# Patient Record
Sex: FEMALE | Race: WHITE | Employment: FULL TIME
[De-identification: names, ages, dates, MRNs, and addresses within clinical notes are randomized per-mention and may not be internally consistent; named-entity substitution may affect disease eponyms.]

---

## 2017-02-20 ENCOUNTER — TELEPHONE (OUTPATIENT)
Dept: FAMILY MEDICINE CLINIC | Facility: CLINIC | Age: 32
End: 2017-02-20

## 2017-02-21 ENCOUNTER — OFFICE VISIT (OUTPATIENT)
Dept: FAMILY MEDICINE CLINIC | Facility: CLINIC | Age: 32
End: 2017-02-21

## 2017-02-21 VITALS
HEIGHT: 63 IN | DIASTOLIC BLOOD PRESSURE: 78 MMHG | WEIGHT: 205 LBS | SYSTOLIC BLOOD PRESSURE: 110 MMHG | BODY MASS INDEX: 36.32 KG/M2

## 2017-02-21 DIAGNOSIS — R26.81 UNSTEADINESS: Primary | ICD-10-CM

## 2017-02-21 DIAGNOSIS — R42 VERTIGO: ICD-10-CM

## 2017-02-21 DIAGNOSIS — H57.02 ANISOCORIA: ICD-10-CM

## 2017-02-21 PROCEDURE — 99214 OFFICE O/P EST MOD 30 MIN: CPT | Performed by: FAMILY MEDICINE

## 2017-02-21 RX ORDER — PREDNISONE 20 MG/1
40 TABLET ORAL DAILY
Qty: 10 TABLET | Refills: 0 | Status: SHIPPED | OUTPATIENT
Start: 2017-02-21 | End: 2017-02-26

## 2017-02-23 ENCOUNTER — TELEPHONE (OUTPATIENT)
Dept: FAMILY MEDICINE CLINIC | Facility: CLINIC | Age: 32
End: 2017-02-23

## 2017-02-27 ENCOUNTER — TELEPHONE (OUTPATIENT)
Dept: FAMILY MEDICINE CLINIC | Facility: CLINIC | Age: 32
End: 2017-02-27

## 2017-02-27 RX ORDER — DIAZEPAM 5 MG/1
5 TABLET ORAL EVERY 8 HOURS PRN
Qty: 20 TABLET | Refills: 0 | Status: SHIPPED | OUTPATIENT
Start: 2017-02-27 | End: 2017-02-27

## 2017-02-27 ASSESSMENT — ENCOUNTER SYMPTOMS
CONSTIPATION: 0
NAUSEA: 1
DIARRHEA: 0
RESPIRATORY NEGATIVE: 1

## 2017-02-28 ENCOUNTER — TELEPHONE (OUTPATIENT)
Dept: FAMILY MEDICINE CLINIC | Facility: CLINIC | Age: 32
End: 2017-02-28

## 2017-02-28 RX ORDER — SCOLOPAMINE TRANSDERMAL SYSTEM 1 MG/1
1 PATCH, EXTENDED RELEASE TRANSDERMAL
Qty: 10 PATCH | Refills: 0 | Status: SHIPPED | OUTPATIENT
Start: 2017-02-28 | End: 2017-07-27 | Stop reason: ALTCHOICE

## 2017-03-06 ENCOUNTER — OFFICE VISIT (OUTPATIENT)
Dept: FAMILY MEDICINE CLINIC | Facility: CLINIC | Age: 32
End: 2017-03-06

## 2017-03-06 VITALS
BODY MASS INDEX: 36.68 KG/M2 | HEART RATE: 72 BPM | SYSTOLIC BLOOD PRESSURE: 128 MMHG | HEIGHT: 63 IN | DIASTOLIC BLOOD PRESSURE: 72 MMHG | RESPIRATION RATE: 16 BRPM | WEIGHT: 207 LBS

## 2017-03-06 DIAGNOSIS — R42 VERTIGO: Primary | ICD-10-CM

## 2017-03-06 PROCEDURE — 99213 OFFICE O/P EST LOW 20 MIN: CPT | Performed by: FAMILY MEDICINE

## 2017-03-06 ASSESSMENT — ENCOUNTER SYMPTOMS: RESPIRATORY NEGATIVE: 1

## 2017-03-10 ENCOUNTER — TELEPHONE (OUTPATIENT)
Dept: FAMILY MEDICINE CLINIC | Facility: CLINIC | Age: 32
End: 2017-03-10

## 2017-07-27 ENCOUNTER — HOSPITAL ENCOUNTER (OUTPATIENT)
Age: 32
Discharge: HOME | End: 2017-07-27
Payer: COMMERCIAL

## 2017-07-27 ENCOUNTER — OFFICE VISIT (OUTPATIENT)
Dept: FAMILY MEDICINE CLINIC | Age: 32
End: 2017-07-27
Payer: COMMERCIAL

## 2017-07-27 VITALS
DIASTOLIC BLOOD PRESSURE: 70 MMHG | HEIGHT: 63 IN | BODY MASS INDEX: 32.6 KG/M2 | WEIGHT: 184 LBS | SYSTOLIC BLOOD PRESSURE: 110 MMHG

## 2017-07-27 DIAGNOSIS — R00.2 PALPITATIONS: ICD-10-CM

## 2017-07-27 DIAGNOSIS — R63.4 WEIGHT LOSS: ICD-10-CM

## 2017-07-27 DIAGNOSIS — R00.2 PALPITATIONS: Primary | ICD-10-CM

## 2017-07-27 DIAGNOSIS — R45.89: ICD-10-CM

## 2017-07-27 DIAGNOSIS — R53.82 CHRONIC FATIGUE, UNSPECIFIED: ICD-10-CM

## 2017-07-27 LAB
ABSOLUTE EOS #: 0.1 K/UL (ref 0–0.4)
ABSOLUTE LYMPH #: 1.8 K/UL (ref 1.1–2.7)
ABSOLUTE MONO #: 0.5 K/UL (ref 0–1)
ANION GAP SERPL CALCULATED.3IONS-SCNC: 9 MMOL/L (ref 9–17)
BASOPHILS # BLD: 1 %
BASOPHILS ABSOLUTE: 0.1 K/UL (ref 0–0.2)
BUN BLDV-MCNC: 12 MG/DL (ref 6–20)
BUN/CREAT BLD: 14 (ref 9–20)
CALCIUM SERPL-MCNC: 9.4 MG/DL (ref 8.6–10.4)
CHLORIDE BLD-SCNC: 106 MMOL/L (ref 98–107)
CO2: 24 MMOL/L (ref 20–31)
CREAT SERPL-MCNC: 0.86 MG/DL (ref 0.5–0.9)
DIFFERENTIAL TYPE: YES
EOSINOPHILS RELATIVE PERCENT: 1 %
GFR AFRICAN AMERICAN: >60 ML/MIN
GFR NON-AFRICAN AMERICAN: >60 ML/MIN
GFR SERPL CREATININE-BSD FRML MDRD: ABNORMAL ML/MIN/{1.73_M2}
GFR SERPL CREATININE-BSD FRML MDRD: ABNORMAL ML/MIN/{1.73_M2}
GLUCOSE BLD-MCNC: 97 MG/DL (ref 70–99)
HCT VFR BLD CALC: 40.7 % (ref 36–46)
HEMOGLOBIN: 13.6 G/DL (ref 12–16)
LYMPHOCYTES # BLD: 19 %
MCH RBC QN AUTO: 29.1 PG (ref 26–34)
MCHC RBC AUTO-ENTMCNC: 33.4 G/DL (ref 31–37)
MCV RBC AUTO: 87.3 FL (ref 80–100)
MONOCYTES # BLD: 5 %
PDW BLD-RTO: 15.3 % (ref 12.1–15.2)
PLATELET # BLD: 321 K/UL (ref 140–450)
PLATELET ESTIMATE: ABNORMAL
PMV BLD AUTO: ABNORMAL FL (ref 6–12)
POTASSIUM SERPL-SCNC: 5.7 MMOL/L (ref 3.7–5.3)
RBC # BLD: 4.67 M/UL (ref 4–5.2)
RBC # BLD: ABNORMAL 10*6/UL
SEG NEUTROPHILS: 74 %
SEGMENTED NEUTROPHILS ABSOLUTE COUNT: 7.4 K/UL (ref 2.5–7)
SODIUM BLD-SCNC: 139 MMOL/L (ref 135–144)
TSH SERPL DL<=0.05 MIU/L-ACNC: 2.35 MIU/L (ref 0.3–5)
WBC # BLD: 10 K/UL (ref 3.5–11)
WBC # BLD: ABNORMAL 10*3/UL

## 2017-07-27 PROCEDURE — 80048 BASIC METABOLIC PNL TOTAL CA: CPT

## 2017-07-27 PROCEDURE — 84443 ASSAY THYROID STIM HORMONE: CPT

## 2017-07-27 PROCEDURE — 85025 COMPLETE CBC W/AUTO DIFF WBC: CPT

## 2017-07-27 PROCEDURE — 36415 COLL VENOUS BLD VENIPUNCTURE: CPT

## 2017-07-27 PROCEDURE — 99214 OFFICE O/P EST MOD 30 MIN: CPT | Performed by: FAMILY MEDICINE

## 2017-07-27 RX ORDER — HYDROXYZINE PAMOATE 25 MG/1
25 CAPSULE ORAL 3 TIMES DAILY PRN
Qty: 60 CAPSULE | Refills: 3
Start: 2017-07-27 | End: 2017-11-17 | Stop reason: SDUPTHER

## 2017-07-27 RX ORDER — PROPRANOLOL HYDROCHLORIDE 10 MG/1
10 TABLET ORAL 3 TIMES DAILY PRN
Qty: 60 TABLET | Refills: 3 | Status: SHIPPED | OUTPATIENT
Start: 2017-07-27 | End: 2018-06-27 | Stop reason: SDUPTHER

## 2017-07-27 ASSESSMENT — PATIENT HEALTH QUESTIONNAIRE - PHQ9
2. FEELING DOWN, DEPRESSED OR HOPELESS: 0
SUM OF ALL RESPONSES TO PHQ9 QUESTIONS 1 & 2: 0
1. LITTLE INTEREST OR PLEASURE IN DOING THINGS: 0
SUM OF ALL RESPONSES TO PHQ QUESTIONS 1-9: 0

## 2017-07-28 ENCOUNTER — TELEPHONE (OUTPATIENT)
Dept: FAMILY MEDICINE CLINIC | Age: 32
End: 2017-07-28

## 2017-07-28 DIAGNOSIS — E87.5 HYPERKALEMIA: Primary | ICD-10-CM

## 2017-07-28 RX ORDER — SODIUM POLYSTYRENE SULFONATE 15 G/60ML
15 SUSPENSION ORAL; RECTAL ONCE
Qty: 60 ML | Refills: 0 | Status: SHIPPED | OUTPATIENT
Start: 2017-07-28 | End: 2017-11-17

## 2017-07-30 ASSESSMENT — ENCOUNTER SYMPTOMS
EYES NEGATIVE: 1
GASTROINTESTINAL NEGATIVE: 1

## 2017-07-31 ENCOUNTER — TELEPHONE (OUTPATIENT)
Dept: FAMILY MEDICINE CLINIC | Age: 32
End: 2017-07-31

## 2017-07-31 ENCOUNTER — HOSPITAL ENCOUNTER (OUTPATIENT)
Age: 32
Discharge: HOME | End: 2017-07-31
Payer: COMMERCIAL

## 2017-07-31 DIAGNOSIS — E87.5 HYPERKALEMIA: ICD-10-CM

## 2017-07-31 LAB
ANION GAP SERPL CALCULATED.3IONS-SCNC: 11 MMOL/L (ref 9–17)
BUN BLDV-MCNC: 13 MG/DL (ref 6–20)
BUN/CREAT BLD: 17 (ref 9–20)
CALCIUM SERPL-MCNC: 9.4 MG/DL (ref 8.6–10.4)
CHLORIDE BLD-SCNC: 103 MMOL/L (ref 98–107)
CO2: 25 MMOL/L (ref 20–31)
CREAT SERPL-MCNC: 0.76 MG/DL (ref 0.5–0.9)
GFR AFRICAN AMERICAN: >60 ML/MIN
GFR NON-AFRICAN AMERICAN: >60 ML/MIN
GFR SERPL CREATININE-BSD FRML MDRD: NORMAL ML/MIN/{1.73_M2}
GFR SERPL CREATININE-BSD FRML MDRD: NORMAL ML/MIN/{1.73_M2}
GLUCOSE BLD-MCNC: 96 MG/DL (ref 70–99)
POTASSIUM SERPL-SCNC: 3.9 MMOL/L (ref 3.7–5.3)
SODIUM BLD-SCNC: 139 MMOL/L (ref 135–144)

## 2017-07-31 PROCEDURE — 36415 COLL VENOUS BLD VENIPUNCTURE: CPT

## 2017-07-31 PROCEDURE — 80048 BASIC METABOLIC PNL TOTAL CA: CPT

## 2017-08-01 ENCOUNTER — TELEPHONE (OUTPATIENT)
Dept: FAMILY MEDICINE CLINIC | Age: 32
End: 2017-08-01

## 2017-08-01 DIAGNOSIS — R00.2 PALPITATIONS: Primary | ICD-10-CM

## 2017-11-17 ENCOUNTER — OFFICE VISIT (OUTPATIENT)
Dept: FAMILY MEDICINE CLINIC | Age: 32
End: 2017-11-17
Payer: COMMERCIAL

## 2017-11-17 VITALS
SYSTOLIC BLOOD PRESSURE: 118 MMHG | DIASTOLIC BLOOD PRESSURE: 72 MMHG | BODY MASS INDEX: 29.95 KG/M2 | HEIGHT: 63 IN | WEIGHT: 169 LBS

## 2017-11-17 DIAGNOSIS — F41.0 PANIC ATTACKS: ICD-10-CM

## 2017-11-17 DIAGNOSIS — R00.2 PALPITATIONS: Primary | ICD-10-CM

## 2017-11-17 DIAGNOSIS — R63.4 WEIGHT LOSS: ICD-10-CM

## 2017-11-17 PROCEDURE — 99214 OFFICE O/P EST MOD 30 MIN: CPT | Performed by: FAMILY MEDICINE

## 2017-11-17 RX ORDER — HYDROXYZINE PAMOATE 25 MG/1
25 CAPSULE ORAL 3 TIMES DAILY PRN
Qty: 30 CAPSULE | Refills: 1 | Status: SHIPPED | OUTPATIENT
Start: 2017-11-17 | End: 2017-12-17

## 2017-11-17 RX ORDER — PROPRANOLOL HYDROCHLORIDE 10 MG/1
10 TABLET ORAL 3 TIMES DAILY PRN
Qty: 60 TABLET | Refills: 3 | Status: CANCELLED | OUTPATIENT
Start: 2017-11-17

## 2017-11-17 RX ORDER — HYDROXYZINE PAMOATE 25 MG/1
25 CAPSULE ORAL 3 TIMES DAILY PRN
Qty: 60 CAPSULE | Refills: 3 | Status: CANCELLED | OUTPATIENT
Start: 2017-11-17 | End: 2017-12-17

## 2017-11-17 NOTE — PROGRESS NOTES
Name: Terese Fang  : 1985         Chief Complaint:     Chief Complaint   Patient presents with    Palpitations    Dizziness    Anxiety       History of Present Illness:      Terese Fang is a 28 y.o.  female who presents with Palpitations; Dizziness; and Anxiety      HPI     Episode of palpitations a few days ago and again last night, lasting about 25 minutes. Had to squat down and hold breath few times which didn't help. Didn't have propranolol with her, had to be walked out by friends and driven home by a friend d/t feeling like she would pass out. Took propranolol when she got home and it helped within a few minutes. Prior to this hadn't had an episode for several mos. Feels out of rhythm for a few days after an episode like this. Feels well today. Saw cardiology at [facility] and didn't have any abnormal findings. Wore event monitor but couldn't do the reporting like she needed to while at work, so then she did regular 48h holter instead and didn't have any episodes during that time. She is considering an implantable recording device. Likes propranolol - doesn't cause nearly the drowsiness the lopressor does. Mood - still goes through spurts of anxiety. Last night at work anxiety without any trigger, went home and took a shower and had clammy feeling of hands, blotchy appearance of chest and anterior neck. Has had a handful of panic attacks. Still thinks she's better off without cymbalta. Weight loss not necessarily intentional but changed jobs and walks 7-9 miles per night now. Had IUD placed in August and is doing well with that, having some irreg spotting but not actual menses.     Past Medical History:     Past Medical History:   Diagnosis Date    Cervical dysplasia     Herpes simplex of female genitalia         Past Surgical History:     Past Surgical History:   Procedure Laterality Date    CERVIX BIOPSY      COLPOSCOPY      3/23/15    OTHER SURGICAL HISTORY  6-5-15 Cold knife bx of cervix        Medications:       Prior to Admission medications    Medication Sig Start Date End Date Taking? Authorizing Provider   metoprolol tartrate (LOPRESSOR) 25 MG tablet Take 0.5 tablets by mouth 2 times daily as needed (palpitations) 11/17/17  Yes Marcia Leon DO   hydrOXYzine (VISTARIL) 25 MG capsule Take 1 capsule by mouth 3 times daily as needed for Anxiety 11/17/17 12/17/17 Yes Marcia Leon DO   levonorgestrel [facility]) IUD 13.5 mg 1 each by Intrauterine route once for 1 dose Placed approx August 2017 11/17/17 11/17/17 Yes Marcia Leon DO   propranolol (INDERAL) 10 MG tablet Take 1 tablet by mouth 3 times daily as needed (palpitations) 7/27/17   Marcia Leon DO   meclizine (ANTIVERT) 25 MG tablet Take 1/2  -  1 tablet  TID  As needed for dizziness 8/3/16   Marcia Leon DO        Allergies:       Doxycycline    Social History:     Tobacco:    reports that she has been smoking Cigarettes. She has been smoking about 0.50 packs per day. She has never used smokeless tobacco.  Alcohol:      reports that she drinks alcohol. Drug Use:  reports that she does not use drugs. Family History:     Family History   Problem Relation Age of Onset    High Blood Pressure Father     Cancer Maternal Grandmother      ovarian    Cancer Maternal Aunt      ovarian       Review of Systems:     Positive and Negative as described in HPI    Review of Systems   Constitutional: Negative. Respiratory: Negative. Gastrointestinal: Negative. Physical Exam:     Vitals:  /72   Ht 5' 3\" (1.6 m)   Wt 169 lb (76.7 kg)   BMI 29.94 kg/m²   Physical Exam   Constitutional: She is oriented to person, place, and time. She appears well-developed and well-nourished. No distress. HENT:   Head: Normocephalic and atraumatic. Mouth/Throat: Oropharynx is clear and moist.   Eyes: Conjunctivae and EOM are normal. Pupils are equal, round, and reactive to light. Neck: Neck supple. Cardiovascular: Normal rate, regular rhythm and normal heart sounds. No peripheral edema. Pulmonary/Chest: Effort normal and breath sounds normal.   Lymphadenopathy:     She has no cervical adenopathy. Neurological: She is alert and oriented to person, place, and time. Skin: Skin is warm and dry. Psychiatric: She has a normal mood and affect. Judgment normal.   Nursing note and vitals reviewed. Data:     Lab Results   Component Value Date     07/31/2017    K 3.9 07/31/2017     07/31/2017    CO2 25 07/31/2017    BUN 13 07/31/2017    CREATININE 0.76 07/31/2017    GLUCOSE 96 07/31/2017    LABALBU 4.4 12/19/2012    BILITOT 0.74 12/19/2012    ALKPHOS 61 12/19/2012    AST 15 12/19/2012    ALT 12 12/19/2012     Lab Results   Component Value Date    WBC 10.0 07/27/2017    RBC 4.67 07/27/2017    HGB 13.6 07/27/2017    HCT 40.7 07/27/2017    MCV 87.3 07/27/2017    MCH 29.1 07/27/2017    MCHC 33.4 07/27/2017    RDW 15.3 07/27/2017     07/27/2017    MPV NOT REPORTED 07/27/2017     Lab Results   Component Value Date    TSH 2.35 07/27/2017     Lab Results   Component Value Date    CHOL 117 12/19/2012    HDL 47 12/19/2012         Assessment & Plan:       1. Palpitations     2. Panic attacks     3. Weight loss     ongoing palpitations for which she's undergone extensive workup. Reviewed available records from Dr. Robert Martinez via [facility]. Last office visit with Dr. Robert Martinez was prior to 48h holter, and I don't have the records from the holter monitor but sounds as though pt had no significant palpitations during that time frame anyway. Continue use of BB as needed. Refilled lopressor and she already has propranolol. Intermittent anxiety and panic attacks - cont vistaril prn. Had been on cymbalta but feels better without it. Further weight loss - likely is d/t lifestyle changes - initially had been r/t breakup and better mood & activity levels, but now pt has taken on a much more active job. Still in overweight range. Will monitor - if having unexpected weight loss would recommend some investigation. F/u 6 mos. fmla forms updated. Requested Prescriptions     Signed Prescriptions Disp Refills    metoprolol tartrate (LOPRESSOR) 25 MG tablet 30 tablet 1     Sig: Take 0.5 tablets by mouth 2 times daily as needed (palpitations)    hydrOXYzine (VISTARIL) 25 MG capsule 30 capsule 1     Sig: Take 1 capsule by mouth 3 times daily as needed for Anxiety    levonorgestrel (IGOR) IUD 13.5 mg 1 each 0     Si each by Intrauterine route once for 1 dose Placed approx 2017       Patient Instructions     SURVEY:    You may be receiving a survey from [employer] regarding your visit today. Please complete the survey to enable us to provide the highest quality of care to you and your family. If you cannot score us as very good on any question, please call the office to discuss how we could have made your experience exceptional.     Thank you. Dominique Mena received counseling on the following healthy behaviors: medication adherence  Reviewed prior labs and health maintenance. Continue current medications, diet and exercise. Discussed use, benefit, and side effects of prescribed medications. Barriers to medication compliance addressed. Patient given educational materials - see patient instructions. All patient questions answered. Patient voiced understanding.        Electronically signed by Erich Cote DO on 2017 at 1:19 PM

## 2017-11-21 ASSESSMENT — ENCOUNTER SYMPTOMS
RESPIRATORY NEGATIVE: 1
GASTROINTESTINAL NEGATIVE: 1

## 2018-03-13 ENCOUNTER — OFFICE VISIT (OUTPATIENT)
Dept: FAMILY MEDICINE CLINIC | Age: 33
End: 2018-03-13
Payer: COMMERCIAL

## 2018-03-13 VITALS
WEIGHT: 163 LBS | SYSTOLIC BLOOD PRESSURE: 100 MMHG | BODY MASS INDEX: 28.88 KG/M2 | HEIGHT: 63 IN | DIASTOLIC BLOOD PRESSURE: 60 MMHG

## 2018-03-13 DIAGNOSIS — F41.9 ANXIETY: ICD-10-CM

## 2018-03-13 DIAGNOSIS — I47.9 PAROXYSMAL TACHYCARDIA (HCC): Primary | ICD-10-CM

## 2018-03-13 DIAGNOSIS — R00.2 PALPITATIONS: ICD-10-CM

## 2018-03-13 PROCEDURE — 99214 OFFICE O/P EST MOD 30 MIN: CPT | Performed by: FAMILY MEDICINE

## 2018-03-13 RX ORDER — LORAZEPAM 0.5 MG/1
0.5 TABLET ORAL EVERY 8 HOURS PRN
Qty: 30 TABLET | Refills: 0 | Status: SHIPPED | OUTPATIENT
Start: 2018-03-13 | End: 2018-06-06 | Stop reason: SDUPTHER

## 2018-03-13 RX ORDER — PROPRANOLOL HYDROCHLORIDE 10 MG/1
10 TABLET ORAL 3 TIMES DAILY PRN
Qty: 60 TABLET | Refills: 3 | Status: CANCELLED | OUTPATIENT
Start: 2018-03-13

## 2018-03-13 ASSESSMENT — ENCOUNTER SYMPTOMS: RESPIRATORY NEGATIVE: 1

## 2018-03-13 ASSESSMENT — PATIENT HEALTH QUESTIONNAIRE - PHQ9
SUM OF ALL RESPONSES TO PHQ QUESTIONS 1-9: 0
SUM OF ALL RESPONSES TO PHQ9 QUESTIONS 1 & 2: 0
2. FEELING DOWN, DEPRESSED OR HOPELESS: 0
1. LITTLE INTEREST OR PLEASURE IN DOING THINGS: 0

## 2018-03-13 NOTE — PROGRESS NOTES
Name: Delcie Sicard  : 1985         Chief Complaint:     Chief Complaint   Patient presents with    Palpitations    Anxiety       History of Present Illness:      Delcie Sicard is a 33 y.o.  female who presents with Palpitations and Anxiety      HPI    Pt c/o recurrence of tachycardia and palpitations. Friday 3/9 while at work developed hr 170, lasted about 10 minutes. Had been having a physically hard day at work but had only been there about 2 1/2 hrs. Was putting boxes away and suddenly vision got cloudy. Her instinct was to run to an open area away from machines, sat down in a chair, and a  happened to be in the room. Broke out in full-body sweat, started to cry, looked pale. Painful palpitations. Had to go home from work d/t fatigue. Took BB within 5 min of onset of symptoms. Ever since then, has felt very anxious and panicked, on-edge. Not having rapid heart rate but feels like heart is skipping a beat intermittently, sometimes with a deep breath or sometimes with bending forward. Last episode prior to that had been about a month ago and work personnel almost called ems for her. Had been sparked by bending over to  a wrapper. Was having a lot of chest pain. Lasted about 45 minutes. HR had initially been 120, went up to almost 180, back down, went back up and stayed for quite a while. Took one of her beta blockers at onset. Interested in prn med for anxiety. Hydroxyzine doesn't help at all. Doesn't remember being given valium last yr but doesn't think she has it at home. Had been on cymbalta in the past and doesn't feel she responded well to it, doesn't want daily med. Went to work yesterday, a little iffy at the beginning but did ok overall. Sleeping well. Eats about twice a day and has a good appetite. If she eats at work it's something she packed from home. Works second shift, 3-11, and eats big meal after work.      Medical History:     Patient Active Problem List   Diagnosis    Palpitations    Dyspnea    Smoker motivated to quit    BMI 36.0-36.9,adult    Generalized anxiety disorder    Spells       Medications:       Prior to Admission medications    Medication Sig Start Date End Date Taking? Authorizing Provider   LORazepam (ATIVAN) 0.5 MG tablet Take 1 tablet by mouth every 8 hours as needed for Anxiety for up to 30 days. 3/13/18 4/12/18 Yes Daniela Matt DO   metoprolol tartrate (LOPRESSOR) 25 MG tablet Take 0.5 tablets by mouth 2 times daily as needed (palpitations) 11/17/17  Yes Daniela Matt DO   levonorgestrel [facility]) IUD 13.5 mg 1 each by Intrauterine route once for 1 dose Placed approx August 2017 11/17/17 3/13/18 Yes Daniela Matt DO   propranolol (INDERAL) 10 MG tablet Take 1 tablet by mouth 3 times daily as needed (palpitations) 7/27/17  Yes Daniela Matt DO   meclizine (ANTIVERT) 25 MG tablet Take 1/2  -  1 tablet  TID  As needed for dizziness 8/3/16  Yes Daniela Matt DO        Allergies:       Doxycycline    Review of Systems:     Positive and Negative as described in HPI    Review of Systems   HENT: Negative. Respiratory: Negative. Physical Exam:     Vitals:  /60   Ht 5' 3\" (1.6 m)   Wt 163 lb (73.9 kg)   BMI 28.87 kg/m²   Physical Exam   Constitutional: She is oriented to person, place, and time. She appears well-developed and well-nourished. No distress. HENT:   Head: Normocephalic and atraumatic. Right Ear: Tympanic membrane and ear canal normal.   Left Ear: Tympanic membrane and ear canal normal.   Nose: Nose normal.   Mouth/Throat: Oropharynx is clear and moist and mucous membranes are normal.   Eyes: Conjunctivae and EOM are normal.   Neck: Neck supple. Cardiovascular: Normal rate, regular rhythm and normal heart sounds. No peripheral edema. Pulmonary/Chest: Effort normal and breath sounds normal.   Lymphadenopathy:     She has no cervical adenopathy.    Neurological: She is alert and regarding your visit today. Please complete the survey to enable us to provide the highest quality of care to you and your family. If you cannot score us as very good on any question, please call the office to discuss how we could have made your experience exceptional.     Thank you. Vijay Pickering received counseling on the following healthy behaviors: medication adherence  Reviewed prior labs and health maintenance. Continue current medications, diet and exercise. Discussed use, benefit, and side effects of prescribed medications. Barriers to medication compliance addressed. Patient given educational materials - see patient instructions. All patient questions answered. Patient voiced understanding.        Electronically signed by Andrea Ibarra DO on 3/13/2018 at 10:04 PM

## 2018-03-13 NOTE — PATIENT INSTRUCTIONS
SURVEY:    You may be receiving a survey from [employer] regarding your visit today. Please complete the survey to enable us to provide the highest quality of care to you and your family. If you cannot score us as very good on any question, please call the office to discuss how we could have made your experience exceptional.     Thank you.

## 2018-03-15 RX ORDER — NICOTINE 21 MG/24HR
1 PATCH, TRANSDERMAL 24 HOURS TRANSDERMAL EVERY 24 HOURS
Qty: 42 PATCH | Refills: 0 | Status: SHIPPED | OUTPATIENT
Start: 2018-03-15 | End: 2018-11-21 | Stop reason: ALTCHOICE

## 2018-06-06 DIAGNOSIS — F41.9 ANXIETY: Primary | ICD-10-CM

## 2018-06-07 RX ORDER — LORAZEPAM 0.5 MG/1
0.5 TABLET ORAL EVERY 8 HOURS PRN
Qty: 30 TABLET | Refills: 0 | Status: SHIPPED | OUTPATIENT
Start: 2018-06-07 | End: 2018-09-17 | Stop reason: SDUPTHER

## 2018-06-27 ENCOUNTER — OFFICE VISIT (OUTPATIENT)
Dept: FAMILY MEDICINE CLINIC | Age: 33
End: 2018-06-27
Payer: COMMERCIAL

## 2018-06-27 VITALS
OXYGEN SATURATION: 99 % | DIASTOLIC BLOOD PRESSURE: 70 MMHG | WEIGHT: 170 LBS | SYSTOLIC BLOOD PRESSURE: 110 MMHG | BODY MASS INDEX: 30.12 KG/M2 | HEART RATE: 82 BPM | HEIGHT: 63 IN

## 2018-06-27 DIAGNOSIS — F41.9 ANXIETY: ICD-10-CM

## 2018-06-27 DIAGNOSIS — I47.9 PAROXYSMAL TACHYCARDIA (HCC): Primary | ICD-10-CM

## 2018-06-27 PROCEDURE — 99213 OFFICE O/P EST LOW 20 MIN: CPT | Performed by: FAMILY MEDICINE

## 2018-06-27 RX ORDER — PROPRANOLOL HYDROCHLORIDE 10 MG/1
10 TABLET ORAL 3 TIMES DAILY PRN
Qty: 60 TABLET | Refills: 5 | Status: SHIPPED | OUTPATIENT
Start: 2018-06-27 | End: 2019-03-26 | Stop reason: SDUPTHER

## 2018-06-27 ASSESSMENT — PATIENT HEALTH QUESTIONNAIRE - PHQ9
SUM OF ALL RESPONSES TO PHQ9 QUESTIONS 1 & 2: 0
1. LITTLE INTEREST OR PLEASURE IN DOING THINGS: 0
SUM OF ALL RESPONSES TO PHQ QUESTIONS 1-9: 0
2. FEELING DOWN, DEPRESSED OR HOPELESS: 0

## 2018-06-27 ASSESSMENT — ENCOUNTER SYMPTOMS
RESPIRATORY NEGATIVE: 1
GASTROINTESTINAL NEGATIVE: 1

## 2018-09-17 DIAGNOSIS — F41.9 ANXIETY: ICD-10-CM

## 2018-09-17 RX ORDER — LORAZEPAM 0.5 MG/1
0.5 TABLET ORAL EVERY 8 HOURS PRN
Qty: 30 TABLET | Refills: 0 | Status: SHIPPED | OUTPATIENT
Start: 2018-09-17 | End: 2018-09-25 | Stop reason: SDUPTHER

## 2018-09-17 NOTE — TELEPHONE ENCOUNTER
Ativan 0.5 mg    DM-alma    Health Maintenance   Topic Date Due    DTaP/Tdap/Td vaccine (1 - Tdap) 12/06/2005    Pneumococcal med risk (1 of 1 - PPSV23) 12/06/2005    Flu vaccine (1) 09/01/2018    Cervical cancer screen  07/28/2022    HIV screen  Addressed             (applicable per patient's age: Cancer Screenings, Depression Screening, Fall Risk Screening, Immunizations)    LDL Cholesterol (mg/dL)   Date Value   12/19/2012 59     AST (U/L)   Date Value   12/19/2012 15     ALT (U/L)   Date Value   12/19/2012 12     BUN (mg/dL)   Date Value   07/31/2017 13      (goal A1C is < 7)   (goal LDL is <100) need 30-50% reduction from baseline     BP Readings from Last 3 Encounters:   06/27/18 110/70   03/13/18 100/60   11/17/17 118/72    (goal /80)      All Future Testing planned in [facility]:  Lab Frequency Next Occurrence       Next Visit Date:  No future appointments.          Patient Active Problem List:     Palpitations     Dyspnea     Smoker motivated to quit     BMI 36.0-36.9,adult     Generalized anxiety disorder     Spells

## 2018-09-24 ENCOUNTER — TELEPHONE (OUTPATIENT)
Dept: FAMILY MEDICINE CLINIC | Age: 33
End: 2018-09-24

## 2018-09-24 DIAGNOSIS — F41.9 ANXIETY: ICD-10-CM
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
## 2018-09-25 RX ORDER — LORAZEPAM 0.5 MG/1
0.5 TABLET ORAL EVERY 8 HOURS PRN
Qty: 12 TABLET | Refills: 0 | Status: SHIPPED | OUTPATIENT
Start: 2018-09-25 | End: 2018-11-13 | Stop reason: SDUPTHER

## 2018-09-25 NOTE — TELEPHONE ENCOUNTER
rx sent for #12 which should last a month. Prefer she call monthly for refill. Needs to keep med locked up.

## 2018-11-13 DIAGNOSIS — F41.9 ANXIETY: ICD-10-CM

## 2018-11-13 RX ORDER — LORAZEPAM 0.5 MG/1
0.5 TABLET ORAL EVERY 8 HOURS PRN
Qty: 12 TABLET | Refills: 0 | Status: SHIPPED | OUTPATIENT
Start: 2018-11-13 | End: 2019-01-29 | Stop reason: SDUPTHER

## 2018-11-14 ENCOUNTER — TELEPHONE (OUTPATIENT)
Dept: FAMILY MEDICINE CLINIC | Age: 33
End: 2018-11-14

## 2018-11-14 NOTE — TELEPHONE ENCOUNTER
Patient called stating she has been having panic attacks, anxiety and palps. Dr. Shiloh Gillette does not have appointments available today or this week. She may need to be off work this week. She states she has FMLA. Please advise.

## 2018-11-20 ENCOUNTER — TELEPHONE (OUTPATIENT)
Dept: FAMILY MEDICINE CLINIC | Age: 33
End: 2018-11-20

## 2018-11-20 NOTE — TELEPHONE ENCOUNTER
DR. Valeria French had Deedee Hunter on medical leave and was supposed to return to work on 11/19. She said she didn't go to work yesterday and is not ready to go back today. She would like to know if she can get it extended through today. She would like to return to work on 11/26. She said she is off the rest of the week. Please let Deedee Hunter know. Health Maintenance   Topic Date Due    DTaP/Tdap/Td vaccine (1 - Tdap) 12/06/2005    Pneumococcal med risk (1 of 1 - PPSV23) 12/06/2005    Flu vaccine (1) 09/01/2018    Cervical cancer screen  09/24/2023    HIV screen  Addressed             (applicable per patient's age: Cancer Screenings, Depression Screening, Fall Risk Screening, Immunizations)    LDL Cholesterol (mg/dL)   Date Value   12/19/2012 59     AST (U/L)   Date Value   12/19/2012 15     ALT (U/L)   Date Value   12/19/2012 12     BUN (mg/dL)   Date Value   07/31/2017 13      (goal A1C is < 7)   (goal LDL is <100) need 30-50% reduction from baseline     BP Readings from Last 3 Encounters:   06/27/18 110/70   03/13/18 100/60   11/17/17 118/72    (goal /80)      All Future Testing planned in [facility]:  Lab Frequency Next Occurrence       Next Visit Date:  No future appointments.          Patient Active Problem List:     Palpitations     Dyspnea     Smoker motivated to quit     BMI 36.0-36.9,adult     Generalized anxiety disorder     Spells

## 2018-11-21 ENCOUNTER — OFFICE VISIT (OUTPATIENT)
Dept: FAMILY MEDICINE CLINIC | Age: 33
End: 2018-11-21
Payer: COMMERCIAL

## 2018-11-21 VITALS
HEIGHT: 69 IN | SYSTOLIC BLOOD PRESSURE: 110 MMHG | DIASTOLIC BLOOD PRESSURE: 70 MMHG | BODY MASS INDEX: 26.22 KG/M2 | WEIGHT: 177 LBS

## 2018-11-21 DIAGNOSIS — F41.9 ANXIETY: Primary | ICD-10-CM

## 2018-11-21 DIAGNOSIS — Z63.72 SPOUSE OF ALCOHOLIC: ICD-10-CM

## 2018-11-21 PROCEDURE — 99214 OFFICE O/P EST MOD 30 MIN: CPT | Performed by: FAMILY MEDICINE

## 2018-11-21 ASSESSMENT — ENCOUNTER SYMPTOMS: RESPIRATORY NEGATIVE: 1
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
## 2018-11-21 NOTE — LETTER
[address]  [place] [address]  Phone: [phone]  Fax: [phone]    Leticia Bhatia DO        November 21, 2018     Patient: Mike Perrin   YOB: 1985   Date of Visit: 11/21/2018       To Whom It May Concern: It is my medical opinion that Jadon Alvarez missed work 11/13-20 due to a medical condition. If you have any questions or concerns, please don't hesitate to call.     Sincerely,        Leticia Bhatia DO

## 2018-11-29 ENCOUNTER — TELEPHONE (OUTPATIENT)
Dept: FAMILY MEDICINE CLINIC | Age: 33
End: 2018-11-29

## 2019-01-29 DIAGNOSIS — F41.9 ANXIETY: ICD-10-CM

## 2019-01-29 RX ORDER — LORAZEPAM 0.5 MG/1
0.5 TABLET ORAL EVERY 8 HOURS PRN
Qty: 12 TABLET | Refills: 0 | Status: SHIPPED | OUTPATIENT
Start: 2019-01-29 | End: 2019-03-26 | Stop reason: SDUPTHER

## 2019-02-15 ENCOUNTER — TELEPHONE (OUTPATIENT)
Dept: FAMILY MEDICINE CLINIC | Age: 34
End: 2019-02-15

## 2019-03-26 DIAGNOSIS — F41.9 ANXIETY: ICD-10-CM

## 2019-03-26 RX ORDER — PROPRANOLOL HYDROCHLORIDE 10 MG/1
10 TABLET ORAL 3 TIMES DAILY PRN
Qty: 60 TABLET | Refills: 5 | Status: SHIPPED | OUTPATIENT
Start: 2019-03-26 | End: 2019-11-15 | Stop reason: SDUPTHER

## 2019-03-26 RX ORDER — LORAZEPAM 0.5 MG/1
0.5 TABLET ORAL EVERY 8 HOURS PRN
Qty: 12 TABLET | Refills: 0 | Status: SHIPPED | OUTPATIENT
Start: 2019-03-26 | End: 2019-05-01 | Stop reason: SDUPTHER

## 2019-05-01 ENCOUNTER — HOSPITAL ENCOUNTER (OUTPATIENT)
Dept: NON INVASIVE DIAGNOSTICS | Age: 34
Discharge: HOME | End: 2019-05-01
Payer: COMMERCIAL

## 2019-05-01 ENCOUNTER — OFFICE VISIT (OUTPATIENT)
Dept: FAMILY MEDICINE CLINIC | Age: 34
End: 2019-05-01
Payer: COMMERCIAL

## 2019-05-01 VITALS
WEIGHT: 189 LBS | HEART RATE: 87 BPM | SYSTOLIC BLOOD PRESSURE: 122 MMHG | HEIGHT: 63 IN | DIASTOLIC BLOOD PRESSURE: 86 MMHG | BODY MASS INDEX: 33.49 KG/M2 | OXYGEN SATURATION: 100 %

## 2019-05-01 DIAGNOSIS — R00.2 PALPITATIONS: Primary | ICD-10-CM

## 2019-05-01 DIAGNOSIS — F41.9 ANXIETY: ICD-10-CM

## 2019-05-01 DIAGNOSIS — I47.9 PAROXYSMAL TACHYCARDIA (HCC): ICD-10-CM

## 2019-05-01 DIAGNOSIS — F17.210 CIGARETTE SMOKER: ICD-10-CM

## 2019-05-01 DIAGNOSIS — R00.2 PALPITATIONS: ICD-10-CM

## 2019-05-01 PROCEDURE — 93226 XTRNL ECG REC<48 HR SCAN A/R: CPT

## 2019-05-01 PROCEDURE — 99214 OFFICE O/P EST MOD 30 MIN: CPT | Performed by: FAMILY MEDICINE

## 2019-05-01 PROCEDURE — 93225 XTRNL ECG REC<48 HRS REC: CPT

## 2019-05-01 RX ORDER — LORAZEPAM 0.5 MG/1
.5-1 TABLET ORAL EVERY 8 HOURS PRN
Qty: 30 TABLET | Refills: 0 | Status: SHIPPED | OUTPATIENT
Start: 2019-05-01 | End: 2019-05-31 | Stop reason: SDUPTHER

## 2019-05-01 RX ORDER — VARENICLINE TARTRATE 25 MG
KIT ORAL
Qty: 1 BOX | Refills: 0 | Status: SHIPPED | OUTPATIENT
Start: 2019-05-01 | End: 2019-11-15 | Stop reason: ALTCHOICE
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
## 2019-05-03 ASSESSMENT — ENCOUNTER SYMPTOMS
GASTROINTESTINAL NEGATIVE: 1
RESPIRATORY NEGATIVE: 1

## 2019-05-30 DIAGNOSIS — F41.9 ANXIETY: ICD-10-CM

## 2019-05-30 NOTE — TELEPHONE ENCOUNTER
Patient is asking for a refill on Lorazepam 0.5 mg    Drug [address] Maintenance   Topic Date Due    Pneumococcal 0-64 years Vaccine (1 of 1 - PPSV23) 12/06/1992    Varicella Vaccine (1 of 2 - 13+ 2-dose series) 12/06/1999    DTaP/Tdap/Td vaccine (1 - Tdap) 12/06/2005    Flu vaccine (Season Ended) 09/01/2019    Cervical cancer screen  09/24/2023    HIV screen  Addressed             (applicable per patient's age: Cancer Screenings, Depression Screening, Fall Risk Screening, Immunizations)    LDL Cholesterol (mg/dL)   Date Value   12/19/2012 59     AST (U/L)   Date Value   12/19/2012 15     ALT (U/L)   Date Value   12/19/2012 12     BUN (mg/dL)   Date Value   07/31/2017 13      (goal A1C is < 7)   (goal LDL is <100) need 30-50% reduction from baseline     BP Readings from Last 3 Encounters:   05/01/19 122/86   11/21/18 110/70   06/27/18 110/70    (goal /80)      All Future Testing planned in [facility]:  Lab Frequency Next Occurrence       Next Visit Date:  No future appointments.          Patient Active Problem List:     Palpitations     Smoker motivated to quit     Generalized anxiety disorder

## 2019-05-31 RX ORDER — LORAZEPAM 0.5 MG/1
.5-1 TABLET ORAL EVERY 8 HOURS PRN
Qty: 30 TABLET | Refills: 1 | Status: SHIPPED | OUTPATIENT
Start: 2019-05-31 | End: 2019-07-30

## 2019-07-29 ENCOUNTER — TELEPHONE (OUTPATIENT)
Dept: FAMILY MEDICINE CLINIC | Age: 34
End: 2019-07-29

## 2019-07-29 NOTE — TELEPHONE ENCOUNTER
Requested to speak to Sade Mosqueda. She wanted to give her a message to relay to Dr. Mary Carreno. Health Maintenance   Topic Date Due    Pneumococcal 0-64 years Vaccine (1 of 1 - PPSV23) 12/06/1992    Varicella Vaccine (1 of 2 - 13+ 2-dose series) 12/06/1999    DTaP/Tdap/Td vaccine (1 - Tdap) 12/06/2005    Flu vaccine (1) 09/01/2019    Cervical cancer screen  09/24/2023    HIV screen  Addressed             (applicable per patient's age: Cancer Screenings, Depression Screening, Fall Risk Screening, Immunizations)    LDL Cholesterol (mg/dL)   Date Value   12/19/2012 59     AST (U/L)   Date Value   12/19/2012 15     ALT (U/L)   Date Value   12/19/2012 12     BUN (mg/dL)   Date Value   07/31/2017 13      (goal A1C is < 7)   (goal LDL is <100) need 30-50% reduction from baseline     BP Readings from Last 3 Encounters:   05/01/19 122/86   11/21/18 110/70   06/27/18 110/70    (goal /80)      All Future Testing planned in [facility]:  Lab Frequency Next Occurrence       Next Visit Date:  No future appointments.          Patient Active Problem List:     Palpitations     Smoker motivated to quit     Generalized anxiety disorder

## 2019-07-31 ENCOUNTER — OFFICE VISIT (OUTPATIENT)
Dept: PRIMARY CARE CLINIC | Age: 34
End: 2019-07-31
Payer: COMMERCIAL

## 2019-07-31 VITALS
OXYGEN SATURATION: 100 % | WEIGHT: 193 LBS | DIASTOLIC BLOOD PRESSURE: 79 MMHG | BODY MASS INDEX: 32.95 KG/M2 | HEART RATE: 82 BPM | SYSTOLIC BLOOD PRESSURE: 124 MMHG | TEMPERATURE: 98.1 F | HEIGHT: 64 IN

## 2019-07-31 DIAGNOSIS — M54.12 CERVICAL RADICULOPATHY: Primary | ICD-10-CM

## 2019-07-31 PROCEDURE — 99213 OFFICE O/P EST LOW 20 MIN: CPT | Performed by: NURSE PRACTITIONER

## 2019-07-31 RX ORDER — CYCLOBENZAPRINE HCL 5 MG
5 TABLET ORAL 3 TIMES DAILY PRN
Qty: 30 TABLET | Refills: 0 | Status: SHIPPED | OUTPATIENT
Start: 2019-07-31 | End: 2019-08-10

## 2019-07-31 RX ORDER — LORAZEPAM 0.5 MG/1
0.5 TABLET ORAL EVERY 6 HOURS PRN
COMMUNITY
End: 2019-08-27 | Stop reason: SDUPTHER

## 2019-07-31 RX ORDER — NAPROXEN 500 MG/1
500 TABLET ORAL 2 TIMES DAILY PRN
Qty: 60 TABLET | Refills: 0 | Status: SHIPPED | OUTPATIENT
Start: 2019-07-31 | End: 2019-12-21

## 2019-07-31 ASSESSMENT — ENCOUNTER SYMPTOMS
ALLERGIC/IMMUNOLOGIC NEGATIVE: 1
RESPIRATORY NEGATIVE: 1
GASTROINTESTINAL NEGATIVE: 1
EYES NEGATIVE: 1

## 2019-07-31 NOTE — PROGRESS NOTES
(CHANTIX STARTING MONTH ÁLVARO) 0.5 MG X 11 & 1 MG X 42 tablet Take by mouth. 1 box 0    propranolol (INDERAL) 10 MG tablet Take 1 tablet by mouth 3 times daily as needed (palpitations) 60 tablet 5    levonorgestrel (IGOR) IUD 13.5 mg 1 each by Intrauterine route once for 1 dose Placed approx August 2017 1 each 0     No current facility-administered medications for this visit. Allergies   Allergen Reactions    Doxycycline Other (See Comments)     Lightheaded and passed out. Subjective:      Review of Systems   Constitutional: Positive for activity change. HENT: Negative. Eyes: Negative. Respiratory: Negative. Cardiovascular: Negative. Gastrointestinal: Negative. Endocrine: Negative. Genitourinary: Negative. Musculoskeletal: Positive for neck pain and neck stiffness. Skin: Negative. Allergic/Immunologic: Negative. Neurological: Negative. Hematological: Negative. Psychiatric/Behavioral: Negative. Objective:     Physical Exam   Constitutional: She appears well-developed and well-nourished. HENT:   Head: Normocephalic. Nose: Nose normal.   Mouth/Throat: Oropharynx is clear and moist.   Eyes: Pupils are equal, round, and reactive to light. Conjunctivae and EOM are normal.   Neck: Neck supple. Muscular tenderness present. Decreased range of motion present. No edema and no erythema present. Pain on right side of C3 dermatome with pain on movement. Pain and tightness with passive ROM with flexion and rotation.  strength is WNL bilaterally. No erythema of edema noted. Cardiovascular: Normal rate, regular rhythm, normal heart sounds and intact distal pulses. Musculoskeletal:        Right shoulder: She exhibits decreased range of motion, tenderness, pain and spasm. She exhibits no bony tenderness, no swelling and no deformity. Nursing note and vitals reviewed.     /79   Pulse 82   Temp 98.1 °F (36.7 °C) (Oral)   Ht 5' 4\" (1.626 m)   Wt 193 lb

## 2019-07-31 NOTE — PATIENT INSTRUCTIONS
try an ice pack for 10 to 15 minutes every 2 to 3 hours. There isn't strong evidence that either heat or ice will help. But you can try them to see if they help you. · Don't spend too long in one position. Take short breaks to move around and change positions. · Wear a seat belt and shoulder harness when you are in a car. · Sleep with a pillow under your head and neck that keeps your neck straight. · If you were given a neck brace (cervical collar) to limit neck motion, wear it as instructed for as many days as your doctor tells you to. Do not wear it longer than you were told to. Wearing a brace for too long can lead to neck stiffness and can weaken the neck muscles. · Follow your doctor's instructions for gentle neck-stretching exercises. · Do not smoke. Smoking can slow healing of your discs. If you need help quitting, talk to your doctor about stop-smoking programs and medicines. These can increase your chances of quitting for good. · Avoid strenuous work or exercise until your doctor says it is okay. When should you call for help? Call 911 anytime you think you may need emergency care. For example, call if:    · You are unable to move an arm or a leg at all.   [facility] your doctor now or seek immediate medical care if:    · You have new or worse symptoms in your arms, legs, chest, belly, or buttocks. Symptoms may include:  ? Numbness or tingling. ? Weakness. ? Pain.     · You lose bladder or bowel control.    Watch closely for changes in your health, and be sure to contact your doctor if:    · You are not getting better as expected. Where can you learn more? Go to https://[employer]peQuickCheck Health.Crunched. org and sign in to your [employer] account. Enter I012 in the [employer]. box to learn more about \"Pinched Nerve in the Neck: Care Instructions. \"     If you do not have an account, please click on the \"Sign Up Now\" link.   Current as of: September 20, 2018  Content Version: 12.0  © 8885-4219 Healthwise, Incorporated. Care instructions adapted under license by [facility] [place] ([facility]). If you have questions about a medical condition or this instruction, always ask your healthcare professional. [address] any warranty or liability for your use of this information. Patient Education        Neck Strain or Sprain: Rehab Exercises  Your Care Instructions  Here are some examples of typical rehabilitation exercises for your condition. Start each exercise slowly. Ease off the exercise if you start to have pain. Your doctor or physical therapist will tell you when you can start these exercises and which ones will work best for you. How to do the exercises  Neck rotation    1. Sit in a firm chair, or stand up straight. 2. Keeping your chin level, turn your head to the right, and hold for 15 to 30 seconds. 3. Turn your head to the left and hold for 15 to 30 seconds. 4. Repeat 2 to 4 times to each side. Neck stretches    1. Look straight ahead, and tip your right ear to your right shoulder. Do not let your left shoulder rise up as you tip your head to the right. 2. Hold for 15 to 30 seconds. 3. Tilt your head to the left. Do not let your right shoulder rise up as you tip your head to the left. 4. Hold for 15 to 30 seconds. 5. Repeat 2 to 4 times to each side. Forward neck flexion    1. Sit in a firm chair, or stand up straight. 2. Bend your head forward. 3. Hold for 15 to 30 seconds. 4. Repeat 2 to 4 times. Lateral (side) bend strengthening    1. With your right hand, place your first two fingers on your right temple. 2. Start to bend your head to the side while using gentle pressure from your fingers to keep your head from bending. 3. Hold for about 6 seconds. 4. Repeat 8 to 12 times. 5. Switch hands and repeat the same exercise on your left side. Forward bend strengthening    1. Place your first two fingers of either hand on your forehead.   2. Start to bend your head forward while using gentle pressure from your fingers to keep your head from bending. 3. Hold for about 6 seconds. 4. Repeat 8 to 12 times. Neutral position strengthening    1. Using one hand, place your fingertips on the back of your head at the top of your neck. 2. Start to bend your head backward while using gentle pressure from your fingers to keep your head from bending. 3. Hold for about 6 seconds. 4. Repeat 8 to 12 times. Chin tuck    1. Lie on the floor with a rolled-up towel under your neck. Your head should be touching the floor. 2. Slowly bring your chin toward your chest.  3. Hold for a count of 6, and then relax for up to 10 seconds. 4. Repeat 8 to 12 times. Follow-up care is a key part of your treatment and safety. Be sure to make and go to all appointments, and call your doctor if you are having problems. It's also a good idea to know your test results and keep a list of the medicines you take. Where can you learn more? Go to https://Decide.com."[employer]. org and sign in to your [employer] account. Enter M679 in the [employer] box to learn more about \"Neck Strain or Sprain: Rehab Exercises. \"     If you do not have an account, please click on the \"Sign Up Now\" link. Current as of: September 20, 2018  Content Version: 12.0  © 4452-7430 Healthwise, Incorporated. Care instructions adapted under license by [facility] ([facility]). If you have questions about a medical condition or this instruction, always ask your healthcare professional. [place] [address] any warranty or liability for your use of this information.

## 2019-08-26 DIAGNOSIS — F41.9 ANXIETY: Primary | ICD-10-CM

## 2019-08-26 NOTE — TELEPHONE ENCOUNTER
Patient asking for a new script for Ativan - patient uses Drug [facility] Maintenance   Topic Date Due    Pneumococcal 0-64 years Vaccine (1 of 1 - PPSV23) 12/06/1992    Varicella Vaccine (1 of 2 - 13+ 2-dose series) 12/06/1999    DTaP/Tdap/Td vaccine (1 - Tdap) 12/06/2005    Flu vaccine (1) 09/01/2019    Cervical cancer screen  09/24/2023    HIV screen  Addressed             (applicable per patient's age: Cancer Screenings, Depression Screening, Fall Risk Screening, Immunizations)    LDL Cholesterol (mg/dL)   Date Value   12/19/2012 59     AST (U/L)   Date Value   12/19/2012 15     ALT (U/L)   Date Value   12/19/2012 12     BUN (mg/dL)   Date Value   07/31/2017 13      (goal A1C is < 7)   (goal LDL is <100) need 30-50% reduction from baseline     BP Readings from Last 3 Encounters:   07/31/19 124/79   05/01/19 122/86   11/21/18 110/70    (goal /80)      All Future Testing planned in CarePATH:  Lab Frequency Next Occurrence       Next Visit Date:  No future appointments.          Patient Active Problem List:     Palpitations     Smoker motivated to quit     Generalized anxiety disorder

## 2019-08-27 RX ORDER — LORAZEPAM 0.5 MG/1
0.5 TABLET ORAL EVERY 6 HOURS PRN
Qty: 30 TABLET | Refills: 0 | Status: SHIPPED | OUTPATIENT
Start: 2019-08-27 | End: 2019-09-25 | Stop reason: SDUPTHER

## 2019-09-25 ENCOUNTER — TELEPHONE (OUTPATIENT)
Dept: FAMILY MEDICINE CLINIC | Age: 34
End: 2019-09-25

## 2019-09-25 DIAGNOSIS — F41.9 ANXIETY: ICD-10-CM

## 2019-09-25 RX ORDER — LORAZEPAM 0.5 MG/1
0.5 TABLET ORAL EVERY 6 HOURS PRN
Qty: 30 TABLET | Refills: 0 | Status: SHIPPED | OUTPATIENT
Start: 2019-09-25 | End: 2019-10-18 | Stop reason: SDUPTHER

## 2019-09-25 NOTE — TELEPHONE ENCOUNTER
Lorazepam 0.5 mg    [facility] Maintenance   Topic Date Due    Pneumococcal 0-64 years Vaccine (1 of 1 - PPSV23) 12/06/1992    Varicella Vaccine (1 of 2 - 13+ 2-dose series) 12/06/1999    DTaP/Tdap/Td vaccine (1 - Tdap) 12/06/2005    Flu vaccine (1) 09/01/2019    Cervical cancer screen  09/24/2023    HIV screen  Addressed             (applicable per patient's age: Cancer Screenings, Depression Screening, Fall Risk Screening, Immunizations)    LDL Cholesterol (mg/dL)   Date Value   12/19/2012 59     AST (U/L)   Date Value   12/19/2012 15     ALT (U/L)   Date Value   12/19/2012 12     BUN (mg/dL)   Date Value   07/31/2017 13      (goal A1C is < 7)   (goal LDL is <100) need 30-50% reduction from baseline     BP Readings from Last 3 Encounters:   07/31/19 124/79   05/01/19 122/86   11/21/18 110/70    (goal /80)      All Future Testing planned in CarePATH:  Lab Frequency Next Occurrence       Next Visit Date:  No future appointments.          Patient Active Problem List:     Palpitations     Smoker motivated to quit     Generalized anxiety disorder

## 2019-09-27 ENCOUNTER — TELEPHONE (OUTPATIENT)
Dept: FAMILY MEDICINE CLINIC | Age: 34
End: 2019-09-27

## 2019-10-18 DIAGNOSIS — F41.9 ANXIETY: ICD-10-CM

## 2019-10-18 RX ORDER — LORAZEPAM 0.5 MG/1
0.5 TABLET ORAL EVERY 6 HOURS PRN
Qty: 30 TABLET | Refills: 0 | Status: SHIPPED | OUTPATIENT
Start: 2019-10-18 | End: 2019-12-06 | Stop reason: SDUPTHER

## 2019-11-12 ENCOUNTER — TELEPHONE (OUTPATIENT)
Dept: FAMILY MEDICINE CLINIC | Age: 34
End: 2019-11-12

## 2019-11-15 ENCOUNTER — OFFICE VISIT (OUTPATIENT)
Dept: FAMILY MEDICINE CLINIC | Age: 34
End: 2019-11-15
Payer: COMMERCIAL

## 2019-11-15 VITALS
OXYGEN SATURATION: 99 % | SYSTOLIC BLOOD PRESSURE: 120 MMHG | WEIGHT: 203 LBS | DIASTOLIC BLOOD PRESSURE: 70 MMHG | HEIGHT: 64 IN | BODY MASS INDEX: 34.66 KG/M2 | HEART RATE: 87 BPM

## 2019-11-15 DIAGNOSIS — R00.2 PALPITATIONS: Primary | ICD-10-CM

## 2019-11-15 DIAGNOSIS — F41.9 ANXIETY: ICD-10-CM

## 2019-11-15 PROCEDURE — 99214 OFFICE O/P EST MOD 30 MIN: CPT | Performed by: FAMILY MEDICINE

## 2019-11-15 RX ORDER — DILTIAZEM HYDROCHLORIDE 180 MG/1
180 CAPSULE, COATED, EXTENDED RELEASE ORAL DAILY
Qty: 30 CAPSULE | Refills: 1 | Status: SHIPPED | OUTPATIENT
Start: 2019-11-15 | End: 2020-01-14 | Stop reason: SDUPTHER

## 2019-11-15 RX ORDER — PROPRANOLOL HYDROCHLORIDE 20 MG/1
20 TABLET ORAL 3 TIMES DAILY PRN
Qty: 60 TABLET | Refills: 1 | Status: SHIPPED | OUTPATIENT
Start: 2019-11-15 | End: 2020-04-21 | Stop reason: SDUPTHER

## 2019-11-15 ASSESSMENT — ENCOUNTER SYMPTOMS: GASTROINTESTINAL NEGATIVE: 1

## 2019-11-15 ASSESSMENT — PATIENT HEALTH QUESTIONNAIRE - PHQ9
2. FEELING DOWN, DEPRESSED OR HOPELESS: 1
1. LITTLE INTEREST OR PLEASURE IN DOING THINGS: 1
SUM OF ALL RESPONSES TO PHQ QUESTIONS 1-9: 2
SUM OF ALL RESPONSES TO PHQ9 QUESTIONS 1 & 2: 2
SUM OF ALL RESPONSES TO PHQ QUESTIONS 1-9: 2

## 2019-12-04 ENCOUNTER — OFFICE VISIT (OUTPATIENT)
Dept: FAMILY MEDICINE CLINIC | Age: 34
End: 2019-12-04
Payer: COMMERCIAL

## 2019-12-04 VITALS
HEART RATE: 67 BPM | BODY MASS INDEX: 35.34 KG/M2 | HEIGHT: 64 IN | WEIGHT: 207 LBS | SYSTOLIC BLOOD PRESSURE: 110 MMHG | DIASTOLIC BLOOD PRESSURE: 70 MMHG | OXYGEN SATURATION: 96 %

## 2019-12-04 DIAGNOSIS — N61.0 MASTITIS, RIGHT, ACUTE: Primary | ICD-10-CM

## 2019-12-04 PROCEDURE — 99213 OFFICE O/P EST LOW 20 MIN: CPT | Performed by: FAMILY MEDICINE

## 2019-12-04 RX ORDER — AMOXICILLIN AND CLAVULANATE POTASSIUM 875; 125 MG/1; MG/1
1 TABLET, FILM COATED ORAL 2 TIMES DAILY
Qty: 14 TABLET | Refills: 0 | Status: SHIPPED | OUTPATIENT
Start: 2019-12-04 | End: 2019-12-10 | Stop reason: ALTCHOICE

## 2019-12-06 DIAGNOSIS — F41.9 ANXIETY: ICD-10-CM

## 2019-12-06 RX ORDER — LORAZEPAM 0.5 MG/1
0.5 TABLET ORAL EVERY 6 HOURS PRN
Qty: 30 TABLET | Refills: 0 | Status: SHIPPED | OUTPATIENT
Start: 2019-12-06 | End: 2020-01-05

## 2019-12-10 ENCOUNTER — OFFICE VISIT (OUTPATIENT)
Dept: FAMILY MEDICINE CLINIC | Age: 34
End: 2019-12-10
Payer: COMMERCIAL

## 2019-12-10 VITALS
OXYGEN SATURATION: 98 % | HEART RATE: 92 BPM | DIASTOLIC BLOOD PRESSURE: 70 MMHG | BODY MASS INDEX: 34.49 KG/M2 | WEIGHT: 202 LBS | HEIGHT: 64 IN | SYSTOLIC BLOOD PRESSURE: 110 MMHG

## 2019-12-10 DIAGNOSIS — F41.9 SEVERE ANXIETY: Primary | ICD-10-CM

## 2019-12-10 DIAGNOSIS — F17.210 CIGARETTE SMOKER MOTIVATED TO QUIT: ICD-10-CM

## 2019-12-10 PROCEDURE — 99213 OFFICE O/P EST LOW 20 MIN: CPT | Performed by: FAMILY MEDICINE

## 2019-12-10 RX ORDER — NICOTINE 21 MG/24HR
1 PATCH, TRANSDERMAL 24 HOURS TRANSDERMAL EVERY 24 HOURS
Qty: 42 PATCH | Refills: 0 | Status: SHIPPED | OUTPATIENT
Start: 2019-12-10 | End: 2020-01-14 | Stop reason: ALTCHOICE

## 2019-12-12 ASSESSMENT — ENCOUNTER SYMPTOMS: GASTROINTESTINAL NEGATIVE: 1

## 2019-12-13 ENCOUNTER — TELEPHONE (OUTPATIENT)
Dept: FAMILY MEDICINE CLINIC | Age: 34
End: 2019-12-13

## 2019-12-18 ENCOUNTER — TELEPHONE (OUTPATIENT)
Dept: FAMILY MEDICINE CLINIC | Age: 34
End: 2019-12-18

## 2019-12-21 ENCOUNTER — HOSPITAL ENCOUNTER (EMERGENCY)
Age: 34
Discharge: HOME | End: 2019-12-22
Attending: FAMILY MEDICINE
Payer: COMMERCIAL

## 2019-12-21 ENCOUNTER — APPOINTMENT (OUTPATIENT)
Dept: GENERAL RADIOLOGY | Age: 34
End: 2019-12-21
Payer: COMMERCIAL

## 2019-12-21 DIAGNOSIS — R00.2 PALPITATIONS: Primary | ICD-10-CM

## 2019-12-21 LAB
ABSOLUTE EOS #: 0.3 K/UL (ref 0–0.4)
ABSOLUTE IMMATURE GRANULOCYTE: ABNORMAL K/UL (ref 0–0.3)
ABSOLUTE LYMPH #: 2.9 K/UL (ref 1–4.8)
ABSOLUTE MONO #: 1 K/UL (ref 0–1)
ALBUMIN SERPL-MCNC: 4.3 G/DL (ref 3.5–5.2)
ALBUMIN/GLOBULIN RATIO: ABNORMAL (ref 1–2.5)
ALP BLD-CCNC: 83 U/L (ref 35–104)
ALT SERPL-CCNC: 14 U/L (ref 5–33)
ANION GAP SERPL CALCULATED.3IONS-SCNC: 11 MMOL/L (ref 9–17)
AST SERPL-CCNC: 15 U/L
BASOPHILS # BLD: 1 % (ref 0–2)
BASOPHILS ABSOLUTE: 0.1 K/UL (ref 0–0.2)
BILIRUB SERPL-MCNC: 0.18 MG/DL (ref 0.3–1.2)
BUN BLDV-MCNC: 15 MG/DL (ref 6–20)
BUN/CREAT BLD: 14 (ref 9–20)
CALCIUM SERPL-MCNC: 9.9 MG/DL (ref 8.6–10.4)
CHLORIDE BLD-SCNC: 103 MMOL/L (ref 98–107)
CO2: 24 MMOL/L (ref 20–31)
CREAT SERPL-MCNC: 1.04 MG/DL (ref 0.5–0.9)
D-DIMER QUANTITATIVE: 0.32 MG/L FEU (ref 0–0.5)
DIFFERENTIAL TYPE: YES
EOSINOPHILS RELATIVE PERCENT: 3 % (ref 0–5)
GFR AFRICAN AMERICAN: >60 ML/MIN
GFR NON-AFRICAN AMERICAN: >60 ML/MIN
GFR SERPL CREATININE-BSD FRML MDRD: ABNORMAL ML/MIN/{1.73_M2}
GFR SERPL CREATININE-BSD FRML MDRD: ABNORMAL ML/MIN/{1.73_M2}
GLUCOSE BLD-MCNC: 102 MG/DL (ref 70–99)
HCT VFR BLD CALC: 42.6 % (ref 36–46)
HEMOGLOBIN: 14.6 G/DL (ref 12–16)
IMMATURE GRANULOCYTES: ABNORMAL %
INR BLD: 1
LYMPHOCYTES # BLD: 31 % (ref 15–40)
MCH RBC QN AUTO: 31.8 PG (ref 26–34)
MCHC RBC AUTO-ENTMCNC: 34.3 G/DL (ref 31–37)
MCV RBC AUTO: 92.5 FL (ref 80–100)
MONOCYTES # BLD: 10 % (ref 4–8)
NRBC AUTOMATED: ABNORMAL PER 100 WBC
PARTIAL THROMBOPLASTIN TIME: 26 SEC (ref 21–33)
PDW BLD-RTO: 12.8 % (ref 12.1–15.2)
PLATELET # BLD: 295 K/UL (ref 140–450)
PLATELET ESTIMATE: ABNORMAL
PMV BLD AUTO: ABNORMAL FL (ref 6–12)
POTASSIUM SERPL-SCNC: 4.6 MMOL/L (ref 3.7–5.3)
PROTHROMBIN TIME: 9.9 SEC (ref 9–11.6)
RBC # BLD: 4.61 M/UL (ref 4–5.2)
RBC # BLD: ABNORMAL 10*6/UL
SEG NEUTROPHILS: 55 % (ref 47–75)
SEGMENTED NEUTROPHILS ABSOLUTE COUNT: 5.1 K/UL (ref 2.5–7)
SODIUM BLD-SCNC: 138 MMOL/L (ref 135–144)
TOTAL PROTEIN: 7.4 G/DL (ref 6.4–8.3)
TROPONIN INTERP: NORMAL
TROPONIN T: <0.03 NG/ML
TROPONIN, HIGH SENSITIVITY: NORMAL NG/L (ref 0–14)
TSH SERPL DL<=0.05 MIU/L-ACNC: 5.64 MIU/L (ref 0.3–5)
WBC # BLD: 9.3 K/UL (ref 3.5–11)
WBC # BLD: ABNORMAL 10*3/UL

## 2019-12-21 PROCEDURE — 84439 ASSAY OF FREE THYROXINE: CPT

## 2019-12-21 PROCEDURE — 99285 EMERGENCY DEPT VISIT HI MDM: CPT

## 2019-12-21 PROCEDURE — 71045 X-RAY EXAM CHEST 1 VIEW: CPT

## 2019-12-21 PROCEDURE — 85610 PROTHROMBIN TIME: CPT

## 2019-12-21 PROCEDURE — 84481 FREE ASSAY (FT-3): CPT

## 2019-12-21 PROCEDURE — 85730 THROMBOPLASTIN TIME PARTIAL: CPT

## 2019-12-21 PROCEDURE — 84443 ASSAY THYROID STIM HORMONE: CPT

## 2019-12-21 PROCEDURE — 85379 FIBRIN DEGRADATION QUANT: CPT

## 2019-12-21 PROCEDURE — 84484 ASSAY OF TROPONIN QUANT: CPT

## 2019-12-21 PROCEDURE — 85025 COMPLETE CBC W/AUTO DIFF WBC: CPT

## 2019-12-21 PROCEDURE — 36415 COLL VENOUS BLD VENIPUNCTURE: CPT

## 2019-12-21 PROCEDURE — 80053 COMPREHEN METABOLIC PANEL: CPT

## 2019-12-21 PROCEDURE — 93005 ELECTROCARDIOGRAM TRACING: CPT | Performed by: FAMILY MEDICINE

## 2019-12-22 VITALS
HEART RATE: 63 BPM | SYSTOLIC BLOOD PRESSURE: 102 MMHG | TEMPERATURE: 98.7 F | DIASTOLIC BLOOD PRESSURE: 53 MMHG | WEIGHT: 204.9 LBS | BODY MASS INDEX: 34.98 KG/M2 | HEIGHT: 64 IN | RESPIRATION RATE: 15 BRPM | OXYGEN SATURATION: 100 %

## 2019-12-22 LAB
EKG ATRIAL RATE: 71 BPM
EKG P AXIS: 86 DEGREES
EKG P-R INTERVAL: 122 MS
EKG Q-T INTERVAL: 374 MS
EKG QRS DURATION: 84 MS
EKG QTC CALCULATION (BAZETT): 406 MS
EKG R AXIS: 80 DEGREES
EKG T AXIS: 66 DEGREES
EKG VENTRICULAR RATE: 71 BPM
T3 FREE: 2.19 PG/ML (ref 2.02–4.43)
THYROXINE, FREE: 1.33 NG/DL (ref 0.93–1.7)

## 2019-12-22 PROCEDURE — 93010 ELECTROCARDIOGRAM REPORT: CPT | Performed by: INTERNAL MEDICINE

## 2019-12-23 ENCOUNTER — TELEPHONE (OUTPATIENT)
Dept: FAMILY MEDICINE CLINIC | Age: 34
End: 2019-12-23

## 2019-12-23 RX ORDER — SERTRALINE HYDROCHLORIDE 100 MG/1
100 TABLET, FILM COATED ORAL DAILY
Qty: 30 TABLET | Refills: 1 | Status: SHIPPED | OUTPATIENT
Start: 2019-12-23 | End: 2020-01-14

## 2019-12-23 RX ORDER — QUETIAPINE FUMARATE 50 MG/1
50 TABLET, FILM COATED ORAL NIGHTLY
Qty: 30 TABLET | Refills: 1 | Status: SHIPPED | OUTPATIENT
Start: 2019-12-23 | End: 2019-12-30

## 2019-12-30 ENCOUNTER — TELEPHONE (OUTPATIENT)
Dept: FAMILY MEDICINE CLINIC | Age: 34
End: 2019-12-30

## 2019-12-30 DIAGNOSIS — F41.9 SEVERE ANXIETY: Primary | ICD-10-CM

## 2019-12-30 RX ORDER — CLONAZEPAM 1 MG/1
1 TABLET ORAL 2 TIMES DAILY
Qty: 60 TABLET | Refills: 0 | Status: SHIPPED | OUTPATIENT
Start: 2019-12-30 | End: 2020-02-03 | Stop reason: SDUPTHER

## 2020-01-02 ENCOUNTER — TELEPHONE (OUTPATIENT)
Dept: FAMILY MEDICINE CLINIC | Age: 35
End: 2020-01-02

## 2020-01-02 NOTE — TELEPHONE ENCOUNTER
Yes, recommend she go to [facility] or [facility] ER and they can evaluate her and admit for stabilization if appropriate.

## 2020-01-03 ENCOUNTER — TELEPHONE (OUTPATIENT)
Dept: FAMILY MEDICINE CLINIC | Age: 35
End: 2020-01-03

## 2020-01-03 NOTE — TELEPHONE ENCOUNTER
Will seen for assessment Sukhi. 15 then will referred to psych unless she gets in somewhere else sooner.

## 2020-01-08 ENCOUNTER — TELEPHONE (OUTPATIENT)
Dept: FAMILY MEDICINE CLINIC | Age: 35
End: 2020-01-08

## 2020-01-08 NOTE — TELEPHONE ENCOUNTER
Heidi Steve would like Renay Seaman to call her.     Health Maintenance   Topic Date Due    Varicella Vaccine (1 of 2 - 2-dose childhood series) 12/06/1987    Pneumococcal 0-64 years Vaccine (1 of 1 - PPSV23) 12/06/1992    DTaP/Tdap/Td vaccine (1 - Tdap) 12/06/1997    Flu vaccine (1) 12/04/2020 (Originally 9/1/2019)    Cervical cancer screen  09/24/2023    HIV screen  Addressed             (applicable per patient's age: Cancer Screenings, Depression Screening, Fall Risk Screening, Immunizations)    LDL Cholesterol (mg/dL)   Date Value   12/19/2012 59     AST (U/L)   Date Value   12/21/2019 15     ALT (U/L)   Date Value   12/21/2019 14     BUN (mg/dL)   Date Value   12/21/2019 15      (goal A1C is < 7)   (goal LDL is <100) need 30-50% reduction from baseline     BP Readings from Last 3 Encounters:   12/22/19 (!) 102/53   12/10/19 110/70   12/04/19 110/70    (goal /80)      All Future Testing planned in CarePATH:  Lab Frequency Next Occurrence       Next Visit Date:  Future Appointments   Date Time Provider Balbina Mei   1/14/2020 10:40 AM DO Jane Morejon [facility]            Patient Active Problem List:     Palpitations     Smoker motivated to quit     Generalized anxiety disorder

## 2020-01-14 ENCOUNTER — OFFICE VISIT (OUTPATIENT)
Dept: FAMILY MEDICINE CLINIC | Age: 35
End: 2020-01-14
Payer: COMMERCIAL

## 2020-01-14 VITALS
BODY MASS INDEX: 35.17 KG/M2 | SYSTOLIC BLOOD PRESSURE: 120 MMHG | HEIGHT: 64 IN | HEART RATE: 74 BPM | DIASTOLIC BLOOD PRESSURE: 70 MMHG | OXYGEN SATURATION: 98 % | WEIGHT: 206 LBS

## 2020-01-14 PROCEDURE — 99214 OFFICE O/P EST MOD 30 MIN: CPT | Performed by: FAMILY MEDICINE

## 2020-01-14 RX ORDER — DILTIAZEM HYDROCHLORIDE 240 MG/1
240 CAPSULE, COATED, EXTENDED RELEASE ORAL DAILY
Qty: 30 CAPSULE | Refills: 2 | Status: SHIPPED | OUTPATIENT
Start: 2020-01-14 | End: 2020-04-27 | Stop reason: SDUPTHER

## 2020-01-14 ASSESSMENT — PATIENT HEALTH QUESTIONNAIRE - PHQ9
2. FEELING DOWN, DEPRESSED OR HOPELESS: 1
SUM OF ALL RESPONSES TO PHQ9 QUESTIONS 1 & 2: 2
SUM OF ALL RESPONSES TO PHQ QUESTIONS 1-9: 2
1. LITTLE INTEREST OR PLEASURE IN DOING THINGS: 1
SUM OF ALL RESPONSES TO PHQ QUESTIONS 1-9: 2

## 2020-01-14 ASSESSMENT — ENCOUNTER SYMPTOMS: RESPIRATORY NEGATIVE: 1

## 2020-01-14 NOTE — PROGRESS NOTES
12/21/2019    BUN 15 12/21/2019    CREATININE 1.04 12/21/2019    GLUCOSE 102 12/21/2019    PROT 7.4 12/21/2019    LABALBU 4.3 12/21/2019    BILITOT 0.18 12/21/2019    ALKPHOS 83 12/21/2019    AST 15 12/21/2019    ALT 14 12/21/2019     Lab Results   Component Value Date    WBC 9.3 12/21/2019    RBC 4.61 12/21/2019    HGB 14.6 12/21/2019    HCT 42.6 12/21/2019    MCV 92.5 12/21/2019    MCH 31.8 12/21/2019    MCHC 34.3 12/21/2019    RDW 12.8 12/21/2019     12/21/2019    MPV NOT REPORTED 12/21/2019     Lab Results   Component Value Date    TSH 5.64 12/21/2019     Lab Results   Component Value Date    CHOL 117 12/19/2012    HDL 47 12/19/2012         Assessment & Plan:        Diagnosis Orders   1. Severe anxiety  External Referral To Psychiatry   anxiety which worsened but has now improved a little thanks to klonopin, journaling, exercise, meditation, and cold water therapy. Stopped zoloft and didn't tolerate seroquel. Had intake appt with [facility] and will see counselor tomorrow. Pt wanted to make sure she would also be able to see a psychiatrist. Referral entered. Cont self care and current rx. Increase cardizem to help further with palpitations and tachycardia - has already helped significantly. >25 min spent with pt, >50% counseling re treatment        Requested Prescriptions     Signed Prescriptions Disp Refills    diltiazem (CARDIZEM CD) 240 MG extended release capsule 30 capsule 2     Sig: Take 1 capsule by mouth daily       Patient Instructions   SURVEY:    You may be receiving a survey from [employer] regarding your visit today. You may get this in the mail, through your MyChart or in your email. Please complete the survey to enable us to provide the highest quality of care to you and your family. If you cannot score us as very good ( 5 Stars) on any question, please feel free to call the office to discuss how we could have made your experience exceptional.     Thank you.     Clinical Care

## 2020-02-03 RX ORDER — CLONAZEPAM 1 MG/1
1 TABLET ORAL 2 TIMES DAILY
Qty: 60 TABLET | Refills: 0 | Status: SHIPPED | OUTPATIENT
Start: 2020-02-03 | End: 2020-07-29 | Stop reason: DRUGHIGH

## 2020-02-14 ENCOUNTER — OFFICE VISIT (OUTPATIENT)
Dept: FAMILY MEDICINE CLINIC | Age: 35
End: 2020-02-14
Payer: COMMERCIAL

## 2020-02-14 VITALS
DIASTOLIC BLOOD PRESSURE: 66 MMHG | BODY MASS INDEX: 35.34 KG/M2 | HEART RATE: 79 BPM | HEIGHT: 64 IN | WEIGHT: 207 LBS | SYSTOLIC BLOOD PRESSURE: 118 MMHG | OXYGEN SATURATION: 98 %

## 2020-02-14 PROCEDURE — 99214 OFFICE O/P EST MOD 30 MIN: CPT | Performed by: FAMILY MEDICINE

## 2020-02-14 NOTE — PATIENT INSTRUCTIONS
SURVEY:    You may be receiving a survey from [employer] regarding your visit today. You may get this in the mail, through your MyChart or in your email. Please complete the survey to enable us to provide the highest quality of care to you and your family. If you cannot score us as very good ( 5 Stars) on any question, please feel free to call the office to discuss how we could have made your experience exceptional.     Thank you.     Clinical Care Team:  Dr. Chitra Davenport, DO Shital Wong, [address] [place] [address] Team:  [address] [place] [address]
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
## 2020-02-14 NOTE — LETTER
[facility] [place] [address] [address]  Phone: [phone]  Fax: [address], [place]        February 14, 2020     Patient: Marlene Ramsey   YOB: 1985   Date of Visit: 2/14/2020       To Whom It May Concern: It is my medical opinion that Panchito Mcbride should remain out of work until 3/2/20. If you have any questions or concerns, please don't hesitate to call.     Sincerely,        Gianna Mueller, DO

## 2020-02-16 ASSESSMENT — ENCOUNTER SYMPTOMS: RESPIRATORY NEGATIVE: 1

## 2020-02-26 ENCOUNTER — TELEPHONE (OUTPATIENT)
Dept: FAMILY MEDICINE CLINIC | Age: 35
End: 2020-02-26

## 2020-02-26 NOTE — LETTER
[facility]IMARY [facility] [place] [address] [place] [address]  Phone: [phone]  Fax: Mark[address], JR        February 27, 2020     Patient: Keira Stroud   YOB: 1985   Date of Visit: 2/26/2020       To Whom It May Concern: It is my medical opinion that Lux Tony should remain out of work until 3/16/2020. She may return to work 3/16/2020. If you have any questions or concerns, please don't hesitate to call.     Sincerely,        So Bonilla DO

## 2020-03-11 ENCOUNTER — TELEPHONE (OUTPATIENT)
Dept: FAMILY MEDICINE CLINIC | Age: 35
End: 2020-03-11

## 2020-03-11 NOTE — TELEPHONE ENCOUNTER
Heraclio Ruff called and said they changed her medicine again this time to lexapro. She would like to know if she can have an extended leave from work? Please let patient know. Health Maintenance   Topic Date Due    Varicella vaccine (1 of 2 - 2-dose childhood series) 12/06/1987    DTaP/Tdap/Td vaccine (1 - Tdap) 12/06/2005    Flu vaccine (1) 12/04/2020 (Originally 9/1/2019)    Cervical cancer screen  09/24/2023    Shingles Vaccine (1 of 2) 12/06/2036    HIV screen  Addressed    Hepatitis A vaccine  Aged Out    Hepatitis B vaccine  Aged Out    Hib vaccine  Aged Out    Meningococcal (ACWY) vaccine  Aged Out    Pneumococcal 0-64 years Vaccine  Aged Out             (applicable per patient's age: Cancer Screenings, Depression Screening, Fall Risk Screening, Immunizations)    LDL Cholesterol (mg/dL)   Date Value   12/19/2012 59     AST (U/L)   Date Value   12/21/2019 15     ALT (U/L)   Date Value   12/21/2019 14     BUN (mg/dL)   Date Value   12/21/2019 15      (goal A1C is < 7)   (goal LDL is <100) need 30-50% reduction from baseline     BP Readings from Last 3 Encounters:   02/14/20 118/66   01/14/20 120/70   12/22/19 (!) 102/53    (goal /80)      All Future Testing planned in [facility]:  Lab Frequency Next Occurrence       Next Visit Date:  No future appointments.          Patient Active Problem List:     Palpitations     Smoker motivated to quit     Generalized anxiety disorder

## 2020-03-11 NOTE — LETTER
[facility] PRIMARY [facility] [address] [address]  Phone: [phone]  Fax: Lc Grimes [address] DO        March 17, 2020     Patient: Heidi Linares   YOB: 1985   Date of Visit: 3/11/2020       To Whom It May Concern: It is my medical opinion that Ashanti Holden may return to work on 3/19/20. excuse on 3/18/20. .    If you have any questions or concerns, please don't hesitate to call.     Sincerely,        Alicia Gómez, DO

## 2020-03-17 ENCOUNTER — TELEPHONE (OUTPATIENT)
Dept: FAMILY MEDICINE CLINIC | Age: 35
End: 2020-03-17

## 2020-03-19 ENCOUNTER — TELEPHONE (OUTPATIENT)
Dept: FAMILY MEDICINE CLINIC | Age: 35
End: 2020-03-19

## 2020-03-19 NOTE — TELEPHONE ENCOUNTER
Klonopin 1 mg    DM-alma    Sarah Fonseca also wanted to know if Allen Roy spoke to her other doctor about being off work. She said she is supposed to go back to work today and she is not. Please let Sarah Fonseca know whats going on. Health Maintenance   Topic Date Due    Varicella vaccine (1 of 2 - 2-dose childhood series) 12/06/1987    DTaP/Tdap/Td vaccine (1 - Tdap) 12/06/2005    Flu vaccine (1) 12/04/2020 (Originally 9/1/2019)    Cervical cancer screen  09/24/2023    Shingles Vaccine (1 of 2) 12/06/2036    HIV screen  Addressed    Hepatitis A vaccine  Aged Out    Hepatitis B vaccine  Aged Out    Hib vaccine  Aged Out    Meningococcal (ACWY) vaccine  Aged Out    Pneumococcal 0-64 years Vaccine  Aged Out             (applicable per patient's age: Cancer Screenings, Depression Screening, Fall Risk Screening, Immunizations)    LDL Cholesterol (mg/dL)   Date Value   12/19/2012 59     AST (U/L)   Date Value   12/21/2019 15     ALT (U/L)   Date Value   12/21/2019 14     BUN (mg/dL)   Date Value   12/21/2019 15      (goal A1C is < 7)   (goal LDL is <100) need 30-50% reduction from baseline     BP Readings from Last 3 Encounters:   02/14/20 118/66   01/14/20 120/70   12/22/19 (!) 102/53    (goal /80)      All Future Testing planned in [facility]:  Lab Frequency Next Occurrence       Next Visit Date:  No future appointments.          Patient Active Problem List:     Palpitations     Smoker motivated to quit     Generalized anxiety disorder

## 2020-04-07 RX ORDER — ESCITALOPRAM OXALATE 5 MG/1
10 TABLET ORAL DAILY
COMMUNITY
Start: 2020-03-05 | End: 2020-10-06 | Stop reason: ALTCHOICE

## 2020-04-21 ENCOUNTER — HOSPITAL ENCOUNTER (OUTPATIENT)
Age: 35
Discharge: HOME | End: 2020-04-21
Payer: COMMERCIAL

## 2020-04-21 ENCOUNTER — TELEPHONE (OUTPATIENT)
Dept: FAMILY MEDICINE CLINIC | Age: 35
End: 2020-04-21

## 2020-04-21 PROCEDURE — 93005 ELECTROCARDIOGRAM TRACING: CPT

## 2020-04-21 RX ORDER — PROPRANOLOL HYDROCHLORIDE 20 MG/1
20 TABLET ORAL 3 TIMES DAILY PRN
Qty: 60 TABLET | Refills: 1 | Status: SHIPPED | OUTPATIENT
Start: 2020-04-21 | End: 2021-01-15

## 2020-04-22 LAB
EKG ATRIAL RATE: 67 BPM
EKG P AXIS: 36 DEGREES
EKG P-R INTERVAL: 128 MS
EKG Q-T INTERVAL: 392 MS
EKG QRS DURATION: 74 MS
EKG QTC CALCULATION (BAZETT): 414 MS
EKG R AXIS: 13 DEGREES
EKG T AXIS: 14 DEGREES
EKG VENTRICULAR RATE: 67 BPM

## 2020-04-22 PROCEDURE — 93010 ELECTROCARDIOGRAM REPORT: CPT | Performed by: INTERNAL MEDICINE

## 2020-04-27 RX ORDER — DILTIAZEM HYDROCHLORIDE 240 MG/1
240 CAPSULE, COATED, EXTENDED RELEASE ORAL DAILY
Qty: 30 CAPSULE | Refills: 2 | Status: SHIPPED | OUTPATIENT
Start: 2020-04-27 | End: 2020-10-06 | Stop reason: ALTCHOICE

## 2020-04-27 NOTE — TELEPHONE ENCOUNTER
Patient asking for a new script for Diltiazem - patient uses [facility] Maintenance   Topic Date Due    Varicella vaccine (1 of 2 - 2-dose childhood series) 12/06/1987    DTaP/Tdap/Td vaccine (1 - Tdap) 12/06/2005    Flu vaccine (Season Ended) 12/04/2020 (Originally 9/1/2020)    Cervical cancer screen  09/24/2023    HIV screen  Addressed    Hepatitis A vaccine  Aged Out    Hepatitis B vaccine  Aged Out    Hib vaccine  Aged Out    Meningococcal (ACWY) vaccine  Aged Out    Pneumococcal 0-64 years Vaccine  Aged Out             (applicable per patient's age: Cancer Screenings, Depression Screening, Fall Risk Screening, Immunizations)    LDL Cholesterol (mg/dL)   Date Value   12/19/2012 59     AST (U/L)   Date Value   12/21/2019 15     ALT (U/L)   Date Value   12/21/2019 14     BUN (mg/dL)   Date Value   12/21/2019 15      (goal A1C is < 7)   (goal LDL is <100) need 30-50% reduction from baseline     BP Readings from Last 3 Encounters:   02/14/20 118/66   01/14/20 120/70   12/22/19 (!) 102/53    (goal /80)      All Future Testing planned in [facility]:  Lab Frequency Next Occurrence       Next Visit Date:  No future appointments.          Patient Active Problem List:     Palpitations     Smoker motivated to quit     Generalized anxiety disorder

## 2020-07-29 ENCOUNTER — OFFICE VISIT (OUTPATIENT)
Dept: PRIMARY CARE CLINIC | Age: 35
End: 2020-07-29
Payer: COMMERCIAL

## 2020-07-29 ENCOUNTER — HOSPITAL ENCOUNTER (OUTPATIENT)
Age: 35
Setting detail: SPECIMEN
Discharge: HOME | End: 2020-07-29
Payer: COMMERCIAL

## 2020-07-29 VITALS
TEMPERATURE: 98.6 F | WEIGHT: 207.4 LBS | BODY MASS INDEX: 35.41 KG/M2 | SYSTOLIC BLOOD PRESSURE: 119 MMHG | RESPIRATION RATE: 16 BRPM | OXYGEN SATURATION: 99 % | HEIGHT: 64 IN | HEART RATE: 78 BPM | DIASTOLIC BLOOD PRESSURE: 73 MMHG

## 2020-07-29 PROCEDURE — U0003 INFECTIOUS AGENT DETECTION BY NUCLEIC ACID (DNA OR RNA); SEVERE ACUTE RESPIRATORY SYNDROME CORONAVIRUS 2 (SARS-COV-2) (CORONAVIRUS DISEASE [COVID-19]), AMPLIFIED PROBE TECHNIQUE, MAKING USE OF HIGH THROUGHPUT TECHNOLOGIES AS DESCRIBED BY CMS-2020-01-R: HCPCS

## 2020-07-29 PROCEDURE — 99213 OFFICE O/P EST LOW 20 MIN: CPT | Performed by: NURSE PRACTITIONER

## 2020-07-29 RX ORDER — CLONAZEPAM 1 MG/1
1 TABLET ORAL DAILY PRN
COMMUNITY
End: 2021-02-19 | Stop reason: SDUPTHER

## 2020-07-29 NOTE — PATIENT INSTRUCTIONS
SURVEY:    You may be receiving a survey from [employer] regarding your visit today. Please complete the survey to enable us to provide the highest quality of care to you and your family. If you cannot score us a very good on any question, please call the office to discuss how we could of made your experience a very good one. Thank you. Patient Education        10 Things to Do When You Have COVID-19    Stay home. Don't go to school, work, or public areas. And don't use public transportation, ride-shares, or taxis unless you have no choice. Leave your home only if you need to get medical care. But call the doctor's office first so they know you're coming. And wear a cloth face cover. Ask before leaving isolation. Talk with your doctor or other health professional about when it will be safe for you to leave isolation. Wear a cloth face cover when you are around other people. It can help stop the spread of the virus when you cough or sneeze. Limit contact with people in your home. If possible, stay in a separate bedroom and use a separate bathroom. Avoid contact with pets and other animals. If possible, have a friend or family member care for them while you're sick. Cover your mouth and nose with a tissue when you cough or sneeze. Then throw the tissue in the trash right away. Wash your hands often, especially after you cough or sneeze. Use soap and water, and scrub for at least 20 seconds. If soap and water aren't available, use an alcohol-based hand . Don't share personal household items. These include bedding, towels, cups and glasses, and eating utensils. Clean and disinfect your home every day. Use household  or disinfectant wipes or sprays. Take special care to clean things that you grab with your hands. These include doorknobs, remote controls, phones, and handles on your refrigerator and microwave.  And don't forget countertops, tabletops, bathrooms, and computer keyboards. Take acetaminophen (Tylenol) to relieve fever and body aches. Read and follow all instructions on the label. Current as of: May 8, 2020               Content Version: 12.5  © 2006-2020 Healthwise, Incorporated. Care instructions adapted under license by [facility] ([facility]). If you have questions about a medical condition or this instruction, always ask your healthcare professional. [place] [address] any warranty or liability for your use of this information. Patient Education        Learning About Coronavirus [phone])  Coronavirus [phone]): Overview  What is coronavirus (LMPFK-19)? The coronavirus disease (COVID-19) is caused by a virus. It is an illness that was first found in [place], [place], in December 2019. It has since spread worldwide. The virus can cause fever, cough, and trouble breathing. In severe cases, it can cause pneumonia and make it hard to breathe without help. It can cause death. Coronaviruses are a large group of viruses. They cause the common cold. They also cause more serious illnesses like Middle East respiratory syndrome (MERS) and severe acute respiratory syndrome (SARS). COVID-19 is caused by a novel coronavirus. That means it's a new type that has not been seen in people before. This virus spreads person-to-person through droplets from coughing and sneezing. It can also spread when you are close to someone who is infected. And it can spread when you touch something that has the virus on it, such as a doorknob or a tabletop. What can you do to protect yourself from coronavirus (COVID-19)? The best way to protect yourself from getting sick is to:  · Avoid areas where there is an outbreak. · Avoid contact with people who may be infected. · Wash your hands often with soap or alcohol-based hand sanitizers. · Avoid crowds and try to stay at least 6 feet away from other people.   · Wash your hands often, especially after you cough or sneeze. Use soap and water, and scrub for at least 20 seconds. If soap and water aren't available, use an alcohol-based hand . · Avoid touching your mouth, nose, and eyes. What can you do to avoid spreading the virus to others? To help avoid spreading the virus to others:  · Cover your mouth with a tissue when you cough or sneeze. Then throw the tissue in the trash. · Use a disinfectant to clean things that you touch often. · Wear a cloth face cover if you have to go to public areas. · Stay home if you are sick or have been exposed to the virus. Don't go to school, work, or public areas. And don't use public transportation, ride-shares, or taxis unless you have no choice. · If you are sick:  ? Leave your home only if you need to get medical care. But call the doctor's office first so they know you're coming. And wear a face cover. ? Wear the face cover whenever you're around other people. It can help stop the spread of the virus when you cough or sneeze. ? Clean and disinfect your home every day. Use household  and disinfectant wipes or sprays. Take special care to clean things that you grab with your hands. These include doorknobs, remote controls, phones, and handles on your refrigerator and microwave. And don't forget countertops, tabletops, bathrooms, and computer keyboards. When to call for help  [id number] anytime you think you may need emergency care. For example, call if:  · You have severe trouble breathing. (You can't talk at all.)  · You have constant chest pain or pressure. · You are severely dizzy or lightheaded. · You are confused or can't think clearly. · Your face and lips have a blue color. · You pass out (lose consciousness) or are very hard to wake up. Call your doctor now if you develop symptoms such as:  · Shortness of breath. · Fever. · Cough. If you need to get care, call ahead to the doctor's office for instructions before you go.  Make sure you wear a face cover to prevent exposing other people to the virus. Where can you get the latest information? The following health organizations are tracking and studying this virus. Their websites contain the most up-to-date information. Lia Peña also learn what to do if you think you may have been exposed to the virus. · U.S. Centers for Disease Control and Prevention (CDC): The CDC provides updated news about the disease and travel advice. The website also tells you how to prevent the spread of infection. www.cdc.gov  · World Health Organization [facility]): WHO offers information about the virus outbreaks. WHO also has travel advice. www.who.int  Current as of: May 8, 2020               Content Version: 12.5  © 2006-2020 Healthwise, Incorporated. Care instructions adapted under license by [facility] ([facility]). If you have questions about a medical condition or this instruction, always ask your healthcare professional. [place] [address] any warranty or liability for your use of this information. · Practice meticulous handwashing and cover cough to prevent spread of infection. · Advised to quarantine self at home until receives results from COVID-19 - will call with results. · Do not return to work or school until symptoms have resolved and no fever for 72 hrs without medication. · Initiated Get Well Loop and scheduled Virtual Visit with [facility]. · Encouraged to increase fluids and rest  · Tylenol OTC PRN for pain, discomfort or fever as directed on package  · Cool mist humidifier  · Hot tea with honey and lemon for cough PRN  · Patient instructions given for suspected Covid 19 infection. .  · To ER or call 911 if any increasing difficulty breathing, shortness of breath, inability to swallow, hives, rash, facial/tongue swelling or temp greater than 103 degrees. · Follow up with PCP or Flu Clinic as needed if symptoms worsen or do not improve.

## 2020-07-29 NOTE — PROGRESS NOTES
Jorge Quintana  1985    Chief Complaint   Patient presents with    Cough     Sx started 2 days ago     Generalized Body Aches    Pharyngitis    Headache       Respiratory Symptoms:  Patient complains of 2 day(s) history of myalgias/arthralgias, headache, clear nasal discharge, sore throat and non-productive cough. Symptoms have been unchanged with time. She denies any other symptoms . Relevant PMH: No pertinent PMH. Smoking history:  She  reports that she quit smoking about 6 months ago. Her smoking use included cigarettes. She has a 5.00 pack-year smoking history. She has never used smokeless tobacco.     She has had no known ill contacts. Treatment to date: Aleve. Travel screen completed:  Yes        HPI:  Started 2 days ago with dry cough, runny nose, sneezing, sore throat, constant headaches, body aches mostly in arms and legs and chest tightness. Having sweats and tactile fever. Denies chills. Chest tightness worse in morning, \"like after you smoke whole pack of cigarettes\". Lower abdominal pain with nausea and diarrhea that is worse with eating. Within hour of eating, have \"pure water\" come out. Denies any known exposure to Covid-19. Works at [employer] and instructed to be tested. Vitals:    07/29/20 1208   BP: 119/73   Site: Left Upper Arm   Position: Sitting   Cuff Size: Large Adult   Pulse: 78   Resp: 16   Temp: 98.6 °F (37 °C)   TempSrc: Oral   SpO2: 99%   Weight: 207 lb 6.4 oz (94.1 kg)   Height: 5' 4\" (1.626 m)      Physical Exam  Vitals signs and nursing note reviewed. Constitutional:       General: She is not in acute distress. Appearance: Normal appearance. She is well-developed. She is not ill-appearing or diaphoretic. Comments: Well hydrated, non-toxic appearance. HENT:      Head: Normocephalic and atraumatic.       Right Ear: Hearing, tympanic membrane, ear canal and external ear normal.      Left Ear: Hearing, tympanic membrane, ear canal and external ear normal.      Nose: Mucosal edema and rhinorrhea present. No congestion. Right Sinus: No maxillary sinus tenderness or frontal sinus tenderness. Left Sinus: No maxillary sinus tenderness or frontal sinus tenderness. Mouth/Throat:      Lips: Pink. Mouth: Mucous membranes are moist.      Pharynx: Oropharynx is clear. Uvula midline. No pharyngeal swelling, oropharyngeal exudate, posterior oropharyngeal erythema or uvula swelling. Eyes:      Conjunctiva/sclera: Conjunctivae normal.      Pupils: Pupils are equal, round, and reactive to light. Cardiovascular:      Rate and Rhythm: Normal rate and regular rhythm. Heart sounds: Normal heart sounds, S1 normal and S2 normal. No murmur. No friction rub. No gallop. Pulmonary:      Effort: Pulmonary effort is normal. No accessory muscle usage or respiratory distress. Breath sounds: Normal breath sounds and air entry. No decreased breath sounds, wheezing, rhonchi or rales. Comments: Breath sounds clear B/L anterior and posterior lobes. Chest expansion symmetrical.  No audible wheezing or respiratory distress. No rales or rhonchi. Rare dry cough. Abdominal:      General: Bowel sounds are normal.      Palpations: Abdomen is soft. Tenderness: There is no abdominal tenderness. There is no guarding or rebound. Musculoskeletal: Normal range of motion. Lymphadenopathy:      Cervical: No cervical adenopathy. Right cervical: No superficial or posterior cervical adenopathy. Left cervical: No superficial or posterior cervical adenopathy. Skin:     General: Skin is warm and dry. Coloration: Skin is not pale. Findings: No erythema or rash. Neurological:      Mental Status: She is alert and oriented to person, place, and time. Psychiatric:         Behavior: Behavior normal. Behavior is cooperative. Assessment/Plan:  1. Viral illness  - Covid-19 Ambulatory;  Future    · Practice meticulous handwashing and cover cough to prevent spread of infection. · Advised to quarantine self at home until receives results from COVID-19 - will call with results. · Do not return to work or school until symptoms have resolved and no fever for 72 hrs without medication. · Initiated Get Well Loop and advised to follow up with [facility]. · Encouraged to increase fluids and rest  · Tylenol OTC PRN for pain, discomfort or fever as directed on package  · Cool mist humidifier  · Hot tea with honey and lemon for cough PRN  · Patient instructions given for suspected Covid 19 infection. .  · To ER or call 911 if any increasing difficulty breathing, shortness of breath, inability to swallow, hives, rash, facial/tongue swelling or temp greater than 103 degrees. · Follow up with PCP or Flu Clinic as needed if symptoms worsen or do not improve. DAMARIS Ramirez - CNP  7/29/20     This visit was provided as a focused evaluation during the COVID -19 pandemic/national emergency. A comprehensive review of all previous patient history and testing was not conducted. Pertinent findings were elicited during the visit.

## 2020-08-01 LAB — SARS-COV-2, NAA: NOT DETECTED

## 2020-10-06 ENCOUNTER — OFFICE VISIT (OUTPATIENT)
Dept: FAMILY MEDICINE CLINIC | Age: 35
End: 2020-10-06
Payer: COMMERCIAL

## 2020-10-06 ENCOUNTER — HOSPITAL ENCOUNTER (OUTPATIENT)
Age: 35
Setting detail: SPECIMEN
Discharge: HOME | End: 2020-10-06
Payer: COMMERCIAL

## 2020-10-06 VITALS
SYSTOLIC BLOOD PRESSURE: 120 MMHG | DIASTOLIC BLOOD PRESSURE: 70 MMHG | BODY MASS INDEX: 36.19 KG/M2 | OXYGEN SATURATION: 100 % | WEIGHT: 212 LBS | HEART RATE: 75 BPM | HEIGHT: 64 IN

## 2020-10-06 LAB
BILIRUBIN, POC: NORMAL
BLOOD URINE, POC: NORMAL
CLARITY, POC: CLEAR
COLOR, POC: NORMAL
GLUCOSE URINE, POC: NORMAL
KETONES, POC: NORMAL
LEUKOCYTE EST, POC: NORMAL
NITRITE, POC: NORMAL
PH, POC: 5.5
PROTEIN, POC: NORMAL
SPECIFIC GRAVITY, POC: 1.02
UROBILINOGEN, POC: 0.2

## 2020-10-06 PROCEDURE — 81002 URINALYSIS NONAUTO W/O SCOPE: CPT | Performed by: FAMILY MEDICINE

## 2020-10-06 PROCEDURE — 99214 OFFICE O/P EST MOD 30 MIN: CPT | Performed by: FAMILY MEDICINE

## 2020-10-06 PROCEDURE — 87086 URINE CULTURE/COLONY COUNT: CPT

## 2020-10-06 NOTE — PROGRESS NOTES
Name: Babita De La Torre  : 1985         Chief Complaint:     Chief Complaint   Patient presents with    Abdominal Pain      2 months, constant cramping. no constipation. has some indigestion. has a new IUD put in a week ago. History of Present Illness:      Babita De La Torre is a 36 y.o.  female who presents with Abdominal Pain ( 2 months, constant cramping. no constipation. has some indigestion. has a new IUD put in a week ago. )      HPI     Pt c/o constant pain lower abdomen past couple mos, much worse in the past few days. Started LLQ a couple mos ago and has gradually spread across the pelvis and is constant. Can make her nauseated d/t severity and at times radiates to low back. Indicates suprapubic and mikaela lower quadrants. Pain waxes and wanes. Pain worse with a full bladder, incl before pelvic ultrasound the other day. Initially pain seemed to occur around the time she'd have BM, but now she's feeling it all the time. BM's regular, occurring daily. Also a little bit of indigestion, which is unusual for her. Few nights ago took first bite of tacos and immediately felt it in throat and chest. Took tums or pepto which helped calm it down.  had IUD changed out and then  underwent colposcopy. Pain not necessarily worse after either of these procedures. Typically gets a very light period around the 7th of each month. Last sexual intercourse was several mos ago. Labor day weekend (just after PAP) pt was in a hot tub with a number of people, and a couple of them stated they had previously had chlamydia for which they had been treated. She hasn't had any vaginal discharge. Anxiety in decent control with klonopin prn and counseling. Palpitations resolved after she underwent ablation for SVT.     Past Medical History:     Past Medical History:   Diagnosis Date    Anxiety     Cervical dysplasia     Herpes simplex of female genitalia         Past Surgical History:     Past Surgical History:   Procedure Laterality Date    CERVIX BIOPSY      COLPOSCOPY      3/23/15    OTHER SURGICAL HISTORY  6-5-15    Cold knife bx of cervix        Medications:       Prior to Admission medications    Medication Sig Start Date End Date Taking? Authorizing Provider   levonorgestrel [facility]) IUD 13.5 mg Placed 9/23/20 by Odalys Shah CNP, at [facility] 10/8/20  Yes Too Plummer DO   clonazePAM (KLONOPIN) 1 MG tablet Take 1 mg by mouth daily as needed. Yes Historical Provider, MD   propranolol (INDERAL) 20 MG tablet Take 1 tablet by mouth 3 times daily as needed (palpitations)  Patient taking differently: Take 60 mg by mouth daily Slow release 4/21/20  Yes Too Plummer DO   azithromycin (ZITHROMAX) 500 MG tablet Take 2 tablets by mouth once for 1 dose 10/8/20 10/8/20  Too Plummer DO   cephALEXin (KEFLEX) 500 MG capsule Take 1 capsule by mouth 3 times daily 10/8/20   Too Plummer DO        Allergies:       Doxycycline    Social History:     Tobacco:    reports that she quit smoking about 9 months ago. Her smoking use included cigarettes. She has a 5.00 pack-year smoking history. She has never used smokeless tobacco.  Alcohol:      reports previous alcohol use. Drug Use:  reports no history of drug use. Family History:     Family History   Problem Relation Age of Onset    High Blood Pressure Father     Cancer Maternal Grandmother         ovarian    Cancer Maternal Aunt         ovarian       Review of Systems:     Positive and Negative as described in HPI    Review of Systems   Constitutional: Negative. Respiratory: Negative. Gastrointestinal: Negative for constipation and diarrhea. Physical Exam:     Vitals:  /70   Pulse 75   Ht 5' 4\" (1.626 m)   Wt 212 lb (96.2 kg)   SpO2 100%   BMI 36.39 kg/m²   Physical Exam  Vitals signs and nursing note reviewed. Constitutional:       General: She is not in acute distress. Appearance: Normal appearance.  She is well-developed. She is not ill-appearing. HENT:      Head: Normocephalic and atraumatic. Eyes:      Conjunctiva/sclera: Conjunctivae normal.   Cardiovascular:      Rate and Rhythm: Normal rate and regular rhythm. Heart sounds: Normal heart sounds. Comments: No peripheral edema. Pulmonary:      Effort: Pulmonary effort is normal.      Breath sounds: Normal breath sounds. Abdominal:      General: Bowel sounds are normal.      Palpations: Abdomen is soft. Tenderness: There is abdominal tenderness (suprapubic and mikaela lower quad). There is no guarding or rebound. Skin:     General: Skin is warm and dry. Neurological:      Mental Status: She is alert and oriented to person, place, and time. Psychiatric:         Mood and Affect: Mood normal.         Behavior: Behavior normal.         Judgment: Judgment normal.         Data:     Lab Results   Component Value Date     12/21/2019    K 4.6 12/21/2019     12/21/2019    CO2 24 12/21/2019    BUN 15 12/21/2019    CREATININE 1.04 12/21/2019    GLUCOSE 102 12/21/2019    PROT 7.4 12/21/2019    LABALBU 4.3 12/21/2019    BILITOT 0.18 12/21/2019    ALKPHOS 83 12/21/2019    AST 15 12/21/2019    ALT 14 12/21/2019     Lab Results   Component Value Date    WBC 9.3 12/21/2019    RBC 4.61 12/21/2019    HGB 14.6 12/21/2019    HCT 42.6 12/21/2019    MCV 92.5 12/21/2019    MCH 31.8 12/21/2019    MCHC 34.3 12/21/2019    RDW 12.8 12/21/2019     12/21/2019    MPV NOT REPORTED 12/21/2019     Lab Results   Component Value Date    TSH 5.64 12/21/2019     Lab Results   Component Value Date    CHOL 117 12/19/2012    HDL 47 12/19/2012     9/3/2020 Pap  Negative for intraepithelial lesion or malignancy. Endocervical and/or squamous metaplastic cells (endocervical component) are present. HPV Optima positive. Genotype 16 and 18-. Chlamydia, gonococcus, and trichomoniasis negative.     9/30/2020 endocervix curettage pathology  Acutely inflamed endocervical mucosa and lower uterine segment with squamous metaplasia and decidualized tissue. Scant squamous epithelial cells. Nondiagnostic for dysplasia. UA  Trace-intact blood, otherwise negative    Assessment & Plan:        Diagnosis Orders   1. Lower abdominal pain  POCT Urinalysis no Micro   2. Other microscopic hematuria  Culture, Urine   pelvic pain for past couple mos. Recent test results per gyn as above. Contacting gyn to obtain US result as well as touch base re acute endocervicitis on colposcopy pathology and will likely treat empirically with abx. If this does not help, will order CT for eval of bowels and consider GI referral. Pt in agreement with plan. >25 min spent with pt. Greater than 50% counseling re diagnostic and treatment options and reviewing data. Requested Prescriptions     Signed Prescriptions Disp Refills    levonorgestrel (IGOR) IUD 13.5 mg 1 each 0     Sig: Placed 9/23/20 by Lanie Lundborg, CNP, at [facility]       There are no Patient Instructions on file for this visit. Katerina Duong received counseling on the following healthy behaviors: medication adherence  Reviewed prior labs and health maintenance. Continue current medications, diet and exercise. Discussed use, benefit, and side effects of prescribed medications. Barriers to medication compliance addressed. Patient given educational materials - see patient instructions. All patient questions answered. Patient voiced understanding.      Electronically signed by Margaret Luis DO on 10/8/2020 at 11:35 AM   [address] [id number]  Dept: [phone]

## 2020-10-06 NOTE — LETTER
[facility] [address] [place] [id number]  Phone: [phone]  Fax: Markos 341, ZA        October 6, 2020     Patient: Fannie Ortiz   YOB: 1985   Date of Visit: 10/6/2020       To Whom It May Concern: It is my medical opinion that Shaye Carpenter missed work 10/4-11 due to a medical condition which is not contagious. If you have any questions or concerns, please don't hesitate to call.     Sincerely,        Myla Coronel, DO

## 2020-10-06 NOTE — PROGRESS NOTES
Constant pain lower abdomen past couple mos, much worse in the past few days. Started LLQ a couple mos ago and has gradually spread across the pelvis and is constant. Can make her nauseated d/t severity and at times radiates to low back. Indicates suprapubic and mikaela lower quadrants. Pain waxes and wanes. Pain worse with a full bladder, incl before pelvic ultrasound the other day. Initially pain seemed to occur around the time she'd have BM, but now she's feeling it all the time. BM's regular, occurring daily. Also a little bit of indigestion, which is unusual for her. Few nights ago took first bite of tacos and immediately felt it in throat and chest. Took tums or pepto which helped calm it down. 9/23 had IUD changed out and then 9/30 underwent colposcopy. Pain not necessarily worse after either of these procedures. Typically gets a very light period around the 7th of each month.

## 2020-10-07 LAB
CULTURE: NO GROWTH
Lab: NORMAL
SPECIMEN DESCRIPTION: NORMAL

## 2020-10-08 ENCOUNTER — TELEPHONE (OUTPATIENT)
Dept: FAMILY MEDICINE CLINIC | Age: 35
End: 2020-10-08

## 2020-10-08 RX ORDER — AZITHROMYCIN 500 MG/1
1000 TABLET, FILM COATED ORAL ONCE
Qty: 2 TABLET | Refills: 0 | Status: SHIPPED | OUTPATIENT
Start: 2020-10-08 | End: 2020-10-08

## 2020-10-08 RX ORDER — LEVONORGESTREL 13.5 MG/1
INTRAUTERINE DEVICE INTRAUTERINE
Qty: 1 EACH | Refills: 0
Start: 2020-10-08 | End: 2021-02-25 | Stop reason: ALTCHOICE

## 2020-10-08 RX ORDER — CEPHALEXIN 500 MG/1
500 CAPSULE ORAL 3 TIMES DAILY
Qty: 21 CAPSULE | Refills: 0 | Status: SHIPPED | OUTPATIENT
Start: 2020-10-08 | End: 2021-01-15 | Stop reason: ALTCHOICE

## 2020-10-08 ASSESSMENT — ENCOUNTER SYMPTOMS
CONSTIPATION: 0
DIARRHEA: 0
RESPIRATORY NEGATIVE: 1

## 2020-10-08 NOTE — TELEPHONE ENCOUNTER
Spoke to gynecology nurse practitioner Nikole Strange. She indicated patient's ultrasound results were completely normal.  We talked about her pathology report showing the acute cervicitis and agreed to use antibiotics to treat that, even though were not sure exactly what type of bacteria is causing it. I prescribed 2 antibiotics, azithromycin that she will take all at once and then a 7-day course of Keflex. Follow-up if things are not improving in the next couple of weeks.

## 2020-10-08 NOTE — TELEPHONE ENCOUNTER
----- Message from Espinoza Mendoza DO sent at 10/8/2020  9:18 AM EDT -----  No UTI. Still waiting on ultrasound results and call back from gynecology.

## 2020-11-23 ENCOUNTER — OFFICE VISIT (OUTPATIENT)
Dept: FAMILY MEDICINE CLINIC | Age: 35
End: 2020-11-23
Payer: COMMERCIAL

## 2020-11-23 VITALS
DIASTOLIC BLOOD PRESSURE: 68 MMHG | SYSTOLIC BLOOD PRESSURE: 102 MMHG | OXYGEN SATURATION: 100 % | HEIGHT: 64 IN | HEART RATE: 64 BPM | WEIGHT: 212 LBS | BODY MASS INDEX: 36.19 KG/M2

## 2020-11-23 PROCEDURE — 99214 OFFICE O/P EST MOD 30 MIN: CPT | Performed by: FAMILY MEDICINE

## 2020-11-23 ASSESSMENT — ENCOUNTER SYMPTOMS: RESPIRATORY NEGATIVE: 1

## 2020-11-23 NOTE — PATIENT INSTRUCTIONS
SURVEY:    You may be receiving a survey from [facility] regarding your visit today. You may get this in the mail, through your MyChart or in your email. Please complete the survey to enable us to provide the highest quality of care to you and your family. If you cannot score us as very good ( 5 Stars) on any question, please feel free to call the office to discuss how we could have made your experience exceptional.     Thank you.     Clinical Care Team:  Dr. Dimitri Huntley, DO [facility], [address] [place] [address] Team:  [address] [place] [address]

## 2020-11-23 NOTE — PROGRESS NOTES
Name: Ale Paz  : 1985         Chief Complaint:     Chief Complaint   Patient presents with    Pelvic Pain     rlq. had improvement with keflex. History of Present Illness:      Ale Paz is a 36 y.o.  female who presents with Pelvic Pain (rlq. had improvement with keflex. )      HPI     Sharp RLQ pains that come and go, can be assoc with needing to have BM but other times can stay all day. BM's have been normal, formed, usually once a day in the morning. Pain can occur before or after BM. Pain helped a little by pushing on the area. Was a constant pain all day 3 days ago and then she hasn't had it the past 2 days. Was on azithro and keflex starting 10/8 and within a couple days could tell a huge difference in symptoms. She thinks the pain stayed away for 3-4 wks. Did a colon cleanse the week of 10/19. At times loose stools or even pure watery diarrhea. However this doesn't necessarily seem to be r/t the abd pain. Pt reveals today that since earlier this year she's sporadically been inducing herself to vomit after eating. Initially she was doing this as a way to relieve panic attacks which were lasting 6-8 hrs and couldn't get out of them. Pt has struggled with her weight throughout her life also and admits that the purging has sometimes been in an effort to lose weight as she hasn't had success with diet (tried Noom, weight watchers) and exercise (walking reservoir, cardio drumming). Recent weight loss & gain-back of 11# in past couple wks. Diet has been sporadic and for a couple wks was just eating protein bars and if she would eat anything else she would purge afterwards. No purging for past week. In therapy and is addressing the purging there. Feels her mood is overall much better than it had been. Working full-time.      Past Medical History:     Past Medical History:   Diagnosis Date    Anxiety     Cervical dysplasia     Herpes simplex of female genitalia         Past Surgical History:     Past Surgical History:   Procedure Laterality Date    CERVIX BIOPSY      COLPOSCOPY      3/23/15    OTHER SURGICAL HISTORY  6-5-15    Cold knife bx of cervix        Medications:       Prior to Admission medications    Medication Sig Start Date End Date Taking? Authorizing Provider   cephALEXin (KEFLEX) 500 MG capsule Take 1 capsule by mouth 3 times daily 10/8/20   Dimitri Huntley DO   levonorgestrel [facility]) IUD 13.5 mg Placed 9/23/20 by Colonel Darnell CNP, at [facility] 10/8/20   Dimitri Huntley DO   clonazePAM (KLONOPIN) 1 MG tablet Take 1 mg by mouth daily as needed. Historical Provider, MD   propranolol (INDERAL) 20 MG tablet Take 1 tablet by mouth 3 times daily as needed (palpitations)  Patient taking differently: Take 60 mg by mouth daily Slow release 4/21/20   Dimitri Huntley DO        Allergies:       Doxycycline    Social History:     Tobacco:    reports that she quit smoking about 10 months ago. Her smoking use included cigarettes. She has a 5.00 pack-year smoking history. She has never used smokeless tobacco.  Alcohol:      reports previous alcohol use. Drug Use:  reports no history of drug use. Family History:     Family History   Problem Relation Age of Onset    High Blood Pressure Father     Cancer Maternal Grandmother         ovarian    Cancer Maternal Aunt         ovarian       Review of Systems:     Positive and Negative as described in HPI    Review of Systems   Constitutional: Negative. HENT: Negative. Respiratory: Negative. Physical Exam:     Vitals:  /68   Pulse 64   Ht 5' 4\" (1.626 m)   Wt 212 lb (96.2 kg)   SpO2 100%   BMI 36.39 kg/m²   Physical Exam  Vitals signs and nursing note reviewed. Constitutional:       General: She is not in acute distress. Appearance: Normal appearance. She is well-developed. She is not ill-appearing. HENT:      Head: Normocephalic and atraumatic.    Eyes:      Conjunctiva/sclera: Conjunctivae

## 2020-11-25 ENCOUNTER — HOSPITAL ENCOUNTER (OUTPATIENT)
Age: 35
Discharge: HOME | End: 2020-11-25
Payer: COMMERCIAL

## 2020-11-25 LAB
ABSOLUTE EOS #: 0.2 K/UL (ref 0–0.4)
ABSOLUTE IMMATURE GRANULOCYTE: ABNORMAL K/UL (ref 0–0.3)
ABSOLUTE LYMPH #: 2.1 K/UL (ref 1–4.8)
ABSOLUTE MONO #: 0.6 K/UL (ref 0–1)
ALBUMIN SERPL-MCNC: 4.1 G/DL (ref 3.5–5.2)
ALBUMIN/GLOBULIN RATIO: ABNORMAL (ref 1–2.5)
ALP BLD-CCNC: 77 U/L (ref 35–104)
ALT SERPL-CCNC: 15 U/L (ref 5–33)
ANION GAP SERPL CALCULATED.3IONS-SCNC: 8 MMOL/L (ref 9–17)
AST SERPL-CCNC: 16 U/L
BASOPHILS # BLD: 0 % (ref 0–2)
BASOPHILS ABSOLUTE: 0 K/UL (ref 0–0.2)
BILIRUB SERPL-MCNC: 0.34 MG/DL (ref 0.3–1.2)
BUN BLDV-MCNC: 15 MG/DL (ref 6–20)
BUN/CREAT BLD: 18 (ref 9–20)
CALCIUM SERPL-MCNC: 9.4 MG/DL (ref 8.6–10.4)
CHLORIDE BLD-SCNC: 105 MMOL/L (ref 98–107)
CO2: 21 MMOL/L (ref 20–31)
CREAT SERPL-MCNC: 0.84 MG/DL (ref 0.5–0.9)
DIFFERENTIAL TYPE: YES
EOSINOPHILS RELATIVE PERCENT: 4 % (ref 0–5)
ESTIMATED AVERAGE GLUCOSE: 103 MG/DL
GFR AFRICAN AMERICAN: >60 ML/MIN
GFR NON-AFRICAN AMERICAN: >60 ML/MIN
GFR SERPL CREATININE-BSD FRML MDRD: ABNORMAL ML/MIN/{1.73_M2}
GFR SERPL CREATININE-BSD FRML MDRD: ABNORMAL ML/MIN/{1.73_M2}
GLUCOSE BLD-MCNC: 107 MG/DL (ref 70–99)
HBA1C MFR BLD: 5.2 % (ref 4–6)
HCT VFR BLD CALC: 40 % (ref 36–46)
HEMOGLOBIN: 13.8 G/DL (ref 12–16)
IMMATURE GRANULOCYTES: ABNORMAL %
LYMPHOCYTES # BLD: 32 % (ref 15–40)
MCH RBC QN AUTO: 31.3 PG (ref 26–34)
MCHC RBC AUTO-ENTMCNC: 34.6 G/DL (ref 31–37)
MCV RBC AUTO: 90.5 FL (ref 80–100)
MONOCYTES # BLD: 9 % (ref 4–8)
NRBC AUTOMATED: ABNORMAL PER 100 WBC
PDW BLD-RTO: 12.9 % (ref 12.1–15.2)
PLATELET # BLD: 267 K/UL (ref 140–450)
PLATELET ESTIMATE: ABNORMAL
PMV BLD AUTO: ABNORMAL FL (ref 6–12)
POTASSIUM SERPL-SCNC: 4.2 MMOL/L (ref 3.7–5.3)
RBC # BLD: 4.42 M/UL (ref 4–5.2)
RBC # BLD: ABNORMAL 10*6/UL
SEG NEUTROPHILS: 55 % (ref 47–75)
SEGMENTED NEUTROPHILS ABSOLUTE COUNT: 3.4 K/UL (ref 2.5–7)
SODIUM BLD-SCNC: 134 MMOL/L (ref 135–144)
THYROXINE, FREE: 1.12 NG/DL (ref 0.93–1.7)
TOTAL PROTEIN: 6.9 G/DL (ref 6.4–8.3)
TSH SERPL DL<=0.05 MIU/L-ACNC: 5.05 MIU/L (ref 0.3–5)
WBC # BLD: 6.3 K/UL (ref 3.5–11)
WBC # BLD: ABNORMAL 10*3/UL

## 2020-11-25 PROCEDURE — 84443 ASSAY THYROID STIM HORMONE: CPT

## 2020-11-25 PROCEDURE — 36415 COLL VENOUS BLD VENIPUNCTURE: CPT

## 2020-11-25 PROCEDURE — 80053 COMPREHEN METABOLIC PANEL: CPT

## 2020-11-25 PROCEDURE — 84439 ASSAY OF FREE THYROXINE: CPT

## 2020-11-25 PROCEDURE — 85025 COMPLETE CBC W/AUTO DIFF WBC: CPT

## 2020-11-25 PROCEDURE — 83036 HEMOGLOBIN GLYCOSYLATED A1C: CPT

## 2020-12-29 ENCOUNTER — TELEPHONE (OUTPATIENT)
Dept: FAMILY MEDICINE CLINIC | Age: 35
End: 2020-12-29

## 2020-12-29 RX ORDER — FLUCONAZOLE 150 MG/1
TABLET ORAL
Qty: 2 TABLET | Refills: 0 | Status: SHIPPED | OUTPATIENT
Start: 2020-12-29 | End: 2021-01-15

## 2020-12-29 NOTE — TELEPHONE ENCOUNTER
Patient complaining of yeast infection - tried cream otc - having itching and odor - no discharge - could something be called in for her or does she need to be seen     Health Maintenance   Topic Date Due    Hepatitis C screen  1985    Varicella vaccine (1 of 2 - 2-dose childhood series) 12/06/1987    DTaP/Tdap/Td vaccine (1 - Tdap) 12/06/2005    Flu vaccine (1) 09/01/2020    Cervical cancer screen  09/03/2025    HIV screen  Addressed    Hepatitis A vaccine  Aged Out    Hepatitis B vaccine  Aged Out    Hib vaccine  Aged Out    Meningococcal (ACWY) vaccine  Aged Out    Pneumococcal 0-64 years Vaccine  Aged Out             (applicable per patient's age: Cancer Screenings, Depression Screening, Fall Risk Screening, Immunizations)    Hemoglobin A1C (%)   Date Value   11/25/2020 5.2     LDL Cholesterol (mg/dL)   Date Value   12/19/2012 59     AST (U/L)   Date Value   11/25/2020 16     ALT (U/L)   Date Value   11/25/2020 15     BUN (mg/dL)   Date Value   11/25/2020 15      (goal A1C is < 7)   (goal LDL is <100) need 30-50% reduction from baseline     BP Readings from Last 3 Encounters:   11/23/20 102/68   10/06/20 120/70   07/29/20 119/73    (goal /80)      All Future Testing planned in [facility]:  Lab Frequency Next Occurrence       Next Visit Date:  No future appointments.          Patient Active Problem List:     H/O supraventricular tachycardia     Generalized anxiety disorder

## 2021-01-14 ENCOUNTER — TELEPHONE (OUTPATIENT)
Dept: FAMILY MEDICINE CLINIC | Age: 36
End: 2021-01-14

## 2021-01-15 ENCOUNTER — OFFICE VISIT (OUTPATIENT)
Dept: FAMILY MEDICINE CLINIC | Age: 36
End: 2021-01-15
Payer: COMMERCIAL

## 2021-01-15 VITALS
SYSTOLIC BLOOD PRESSURE: 104 MMHG | BODY MASS INDEX: 36.54 KG/M2 | OXYGEN SATURATION: 99 % | DIASTOLIC BLOOD PRESSURE: 70 MMHG | HEART RATE: 65 BPM | WEIGHT: 214 LBS | HEIGHT: 64 IN

## 2021-01-15 DIAGNOSIS — F41.1 GENERALIZED ANXIETY DISORDER: ICD-10-CM

## 2021-01-15 DIAGNOSIS — F51.01 PRIMARY INSOMNIA: Primary | ICD-10-CM

## 2021-01-15 DIAGNOSIS — R00.2 PALPITATIONS: ICD-10-CM

## 2021-01-15 PROCEDURE — 99214 OFFICE O/P EST MOD 30 MIN: CPT | Performed by: FAMILY MEDICINE

## 2021-01-15 RX ORDER — ESCITALOPRAM OXALATE 10 MG/1
TABLET ORAL
COMMUNITY
Start: 2021-01-15 | End: 2021-10-22

## 2021-01-15 RX ORDER — PROPRANOLOL HCL 60 MG
CAPSULE, EXTENDED RELEASE 24HR ORAL
COMMUNITY
Start: 2021-01-07 | End: 2022-01-21 | Stop reason: SDUPTHER

## 2021-01-15 ASSESSMENT — PATIENT HEALTH QUESTIONNAIRE - PHQ9
SUM OF ALL RESPONSES TO PHQ QUESTIONS 1-9: 2
2. FEELING DOWN, DEPRESSED OR HOPELESS: 1
1. LITTLE INTEREST OR PLEASURE IN DOING THINGS: 1
SUM OF ALL RESPONSES TO PHQ9 QUESTIONS 1 & 2: 2

## 2021-01-15 NOTE — PROGRESS NOTES
Name: Jackeline Quiroga  : 1985         Chief Complaint:     Chief Complaint   Patient presents with    Anxiety     seeing Dr. Michelle Fuller in March    Panic Attack       History of Present Illness:      Jackeline Quiroga is a 30 y.o.  female who presents with Anxiety (seeing Dr. Michelle Fuller in March) and Panic Attack      HPI     C/o insomnia, a feeling of constant fear and just doesn't sleep. No tv in room. Lays in bed and listens to meditation music on phone or ipad, which she has done for quite a while and had usually helped. Keeps a little salt lamp in her room because she doesn't want it to be totally dark. Usually eventually falls asleep, varying times. Always gets up a little before 8 naturally, can't really sleep in. Nightmares. Trying otc products, melatonin 2 mg with ashwaganda and some other ingredients. Still in therapy once a wk and they've discussed it. Doesn't feel depressed, has actually been better from that standpoint, \"on a gratitude thing\" and reading a certain book that she'll finish tomorrow and then start over. The feelings of fear come and go and have currently been present most of the time for about 2 wks. Sleep trouble about the same time period, seems to go hand in hand. Still gets skipped beats in heart which still scares her but doesn't get the long runs of palpitations ever since her ablation which is a big improvement and relief. Staying busy and moving around helps with anxiety, both at home and at work, but not always able to do that at work (stands still). Klonopin dosing varies, goes as long as she can and doesn't usually take it til the evening. Takes propranolol nightly also. lexapro off and on, prescribed by Dr Michelle Fuller whom she hasn't seen yet, will see in March. Had been seeing Jayda Gautam but she left the practice. Sees a therapist in [place] and does EMDR therapy. Did a rosamaria cleaning of house last weekend.      Having trouble at work because of lack of sleep, called off Tues, went in Wed and fought to get through it. Called off last night. Coworker noticed Wed that pt was off-balance and not performing as well as usual, asked if she was hungover. Few wks ago had an issue at work with panic attack, breaking out in hives, nausea, cold chills, had to go to nurse's station (nurse wasn't there) and sat for about an hr and then went back to work. Past Medical History:     Past Medical History:   Diagnosis Date    Anxiety     Cervical dysplasia     Herpes simplex of female genitalia         Past Surgical History:     Past Surgical History:   Procedure Laterality Date    CERVIX BIOPSY      COLPOSCOPY      3/23/15    OTHER SURGICAL HISTORY  6-5-15    Cold knife bx of cervix        Medications:       Prior to Admission medications    Medication Sig Start Date End Date Taking? Authorizing Provider   levonorgestrel [facility]) IUD 13.5 mg Placed 9/23/20 by Zoran Police, CNP, at [address] 10/8/20  Yes [place] Gauze, DO   clonazePAM (KLONOPIN) 1 MG tablet Take 1 mg by mouth daily as needed. Yes Historical Provider, MD   propranolol (INDERAL LA) 60 MG extended release capsule TAKE 1 CAPSULE BY MOUTH DAILY 1/7/21   Historical Provider, MD   escitalopram (LEXAPRO) 10 MG tablet  1/15/21   Historical Provider, MD        Allergies:       Doxycycline    Social History:     Tobacco:    reports that she quit smoking about 12 months ago. Her smoking use included cigarettes. She has a 5.00 pack-year smoking history. She has never used smokeless tobacco.  Alcohol:      reports previous alcohol use. Drug Use:  reports no history of drug use.     Family History:     Family History   Problem Relation Age of Onset    High Blood Pressure Father     Cancer Maternal Grandmother         ovarian    Cancer Maternal Aunt         ovarian       Review of Systems:     Positive and Negative as described in HPI    Review of Systems    Physical Exam:     Vitals:  /70   Pulse 65   Ht 5'

## 2021-01-15 NOTE — LETTER
[facility] [address] [place] [id number]  Phone: [phone]  Fax: Markos Bernard, DO        January 15, 2021     Patient: Charlene Israel   YOB: 1985   Date of Visit: 1/15/2021       To Whom It May Concern: It is my medical opinion that Adebayo Bender is unable to work 1/14/21-1/25/21. If you have any questions or concerns, please don't hesitate to call.     Sincerely,        Kayla Lackey, DO

## 2021-01-25 ENCOUNTER — TELEPHONE (OUTPATIENT)
Dept: FAMILY MEDICINE CLINIC | Age: 36
End: 2021-01-25

## 2021-01-25 NOTE — TELEPHONE ENCOUNTER
Kathy Sanchez left a message wanting to know if she can have 1 more week off of work. She would like to know if she can return to work on 2/1/21. Please let Kathy Sanchez know. Health Maintenance   Topic Date Due    Hepatitis C screen  1985    Varicella vaccine (1 of 2 - 2-dose childhood series) 12/06/1987    DTaP/Tdap/Td vaccine (1 - Tdap) 12/06/2005    Flu vaccine (1) 09/01/2020    Cervical cancer screen  09/03/2025    HIV screen  Addressed    Hepatitis A vaccine  Aged Out    Hepatitis B vaccine  Aged Out    Hib vaccine  Aged Out    Meningococcal (ACWY) vaccine  Aged Out    Pneumococcal 0-64 years Vaccine  Aged Out             (applicable per patient's age: Cancer Screenings, Depression Screening, Fall Risk Screening, Immunizations)    Hemoglobin A1C (%)   Date Value   11/25/2020 5.2     LDL Cholesterol (mg/dL)   Date Value   12/19/2012 59     AST (U/L)   Date Value   11/25/2020 16     ALT (U/L)   Date Value   11/25/2020 15     BUN (mg/dL)   Date Value   11/25/2020 15      (goal A1C is < 7)   (goal LDL is <100) need 30-50% reduction from baseline     BP Readings from Last 3 Encounters:   01/15/21 104/70   11/23/20 102/68   10/06/20 120/70    (goal /80)      All Future Testing planned in [facility]:  Lab Frequency Next Occurrence       Next Visit Date:  No future appointments.          Patient Active Problem List:     H/O supraventricular tachycardia     Generalized anxiety disorder

## 2021-02-19 ENCOUNTER — TELEPHONE (OUTPATIENT)
Dept: FAMILY MEDICINE CLINIC | Age: 36
End: 2021-02-19

## 2021-02-19 DIAGNOSIS — F41.1 GENERALIZED ANXIETY DISORDER: Primary | ICD-10-CM

## 2021-02-19 RX ORDER — CLONAZEPAM 1 MG/1
1 TABLET ORAL DAILY PRN
Qty: 30 TABLET | Refills: 0 | Status: SHIPPED | OUTPATIENT
Start: 2021-02-19 | End: 2021-10-25 | Stop reason: SDUPTHER

## 2021-02-19 NOTE — TELEPHONE ENCOUNTER
Klonopin 1 mg    Dm-alma    Artist Adore said her psychiatrist is leaving again and they are unable to fill the prescription. She would like to know if she can just get a month sent in until she is able to see a new doctor. Please let Martinist Adore know. Health Maintenance   Topic Date Due    Hepatitis C screen  1985    Varicella vaccine (1 of 2 - 2-dose childhood series) 12/06/1987    DTaP/Tdap/Td vaccine (1 - Tdap) 12/06/2005    Flu vaccine (1) 09/01/2020    Cervical cancer screen  09/03/2025    HIV screen  Addressed    Hepatitis A vaccine  Aged Out    Hepatitis B vaccine  Aged Out    Hib vaccine  Aged Out    Meningococcal (ACWY) vaccine  Aged Out    Pneumococcal 0-64 years Vaccine  Aged Out             (applicable per patient's age: Cancer Screenings, Depression Screening, Fall Risk Screening, Immunizations)    Hemoglobin A1C (%)   Date Value   11/25/2020 5.2     LDL Cholesterol (mg/dL)   Date Value   12/19/2012 59     AST (U/L)   Date Value   11/25/2020 16     ALT (U/L)   Date Value   11/25/2020 15     BUN (mg/dL)   Date Value   11/25/2020 15      (goal A1C is < 7)   (goal LDL is <100) need 30-50% reduction from baseline     BP Readings from Last 3 Encounters:   01/15/21 104/70   11/23/20 102/68   10/06/20 120/70    (goal /80)      All Future Testing planned in [facility]:  Lab Frequency Next Occurrence       Next Visit Date:  No future appointments.          Patient Active Problem List:     H/O supraventricular tachycardia     Generalized anxiety disorder

## 2021-02-19 NOTE — TELEPHONE ENCOUNTER
rx sent    Controlled Substance Monitoring:    Acute and Chronic Pain Monitoring:   RX Monitoring 2/19/2021   Attestation -   Periodic Controlled Substance Monitoring No signs of potential drug abuse or diversion identified.

## 2021-02-25 ENCOUNTER — OFFICE VISIT (OUTPATIENT)
Dept: PRIMARY CARE CLINIC | Age: 36
End: 2021-02-25
Payer: COMMERCIAL

## 2021-02-25 ENCOUNTER — HOSPITAL ENCOUNTER (OUTPATIENT)
Age: 36
Setting detail: SPECIMEN
Discharge: HOME | End: 2021-02-25
Payer: COMMERCIAL

## 2021-02-25 VITALS
TEMPERATURE: 98.3 F | HEIGHT: 64 IN | SYSTOLIC BLOOD PRESSURE: 130 MMHG | WEIGHT: 215.3 LBS | OXYGEN SATURATION: 100 % | HEART RATE: 62 BPM | RESPIRATION RATE: 18 BRPM | BODY MASS INDEX: 36.76 KG/M2 | DIASTOLIC BLOOD PRESSURE: 87 MMHG

## 2021-02-25 DIAGNOSIS — J02.9 PHARYNGITIS, UNSPECIFIED ETIOLOGY: ICD-10-CM

## 2021-02-25 DIAGNOSIS — Z20.822 SUSPECTED COVID-19 VIRUS INFECTION: ICD-10-CM

## 2021-02-25 DIAGNOSIS — J02.9 PHARYNGITIS, UNSPECIFIED ETIOLOGY: Primary | ICD-10-CM

## 2021-02-25 PROBLEM — Z80.41 FAMILY HISTORY OF MALIGNANT NEOPLASM OF OVARY: Status: ACTIVE | Noted: 2021-02-25

## 2021-02-25 LAB — S PYO AG THROAT QL: NORMAL

## 2021-02-25 PROCEDURE — U0005 INFEC AGEN DETEC AMPLI PROBE: HCPCS

## 2021-02-25 PROCEDURE — C9803 HOPD COVID-19 SPEC COLLECT: HCPCS

## 2021-02-25 PROCEDURE — 99213 OFFICE O/P EST LOW 20 MIN: CPT | Performed by: NURSE PRACTITIONER

## 2021-02-25 PROCEDURE — 87880 STREP A ASSAY W/OPTIC: CPT | Performed by: NURSE PRACTITIONER

## 2021-02-25 PROCEDURE — 87651 STREP A DNA AMP PROBE: CPT

## 2021-02-25 PROCEDURE — U0003 INFECTIOUS AGENT DETECTION BY NUCLEIC ACID (DNA OR RNA); SEVERE ACUTE RESPIRATORY SYNDROME CORONAVIRUS 2 (SARS-COV-2) (CORONAVIRUS DISEASE [COVID-19]), AMPLIFIED PROBE TECHNIQUE, MAKING USE OF HIGH THROUGHPUT TECHNOLOGIES AS DESCRIBED BY CMS-2020-01-R: HCPCS

## 2021-02-25 NOTE — PROGRESS NOTES
Saad Juarez  1985    Chief Complaint   Patient presents with    Headache     x 3 days body aches sore throat, cough congestion and muscle pain       Respiratory Symptoms:  Patient complains of a few week(s) history of headache. Symptoms have been worsening with time. She denies fever. Relevant PMH: No pertinent PMH. Smoking history:  She  reports that she quit smoking about 13 months ago. Her smoking use included cigarettes. She has a 5.00 pack-year smoking history. She has never used smokeless tobacco.     She has had no known ill contacts. Treatment to date: none. Travel screen completed:  Yes        HPI:  Started 3 days ago with constant headache, body aches, sore throat, cough, congestion. sweat, fever of 99.9 yesterday, nausea, vomiting and diarrhea and muscle pain. Last time vomited was last night after leaving work, able to keep water down this AM but has not eaten. Diarrhea stool x 1 yesterday evening and 1 this AM.  No blood or mucous. Denies respiratory difficulty or shortness of breath. Denies abdominal pain. Denies loss of taste or loss of smell. Has been taking cold medicine for the symptoms. Denies known exposure to Covid-19. Works at [employer]. Vitals:    02/25/21 0923   BP: 130/87   Pulse: 62   Resp: 18   Temp: 98.3 °F (36.8 °C)   TempSrc: Oral   SpO2: 100%   Weight: 215 lb 4.8 oz (97.7 kg)   Height: 5' 4\" (1.626 m)      Physical Exam  Vitals signs and nursing note reviewed. Constitutional:       General: She is not in acute distress. Appearance: Normal appearance. She is well-developed. She is not ill-appearing or diaphoretic. Comments: Well hydrated, nontoxic appearance. HENT:      Head: Normocephalic and atraumatic.       Right Ear: Hearing, tympanic membrane, ear canal and external ear normal.      Left Ear: Hearing, tympanic membrane, ear canal and external ear normal.      Nose: Nose normal.      Right Sinus: No maxillary sinus tenderness or frontal sinus tenderness. Left Sinus: No maxillary sinus tenderness or frontal sinus tenderness. Mouth/Throat:      Lips: Pink. Mouth: Mucous membranes are moist.      Pharynx: Uvula midline. Pharyngeal swelling and posterior oropharyngeal erythema present. No oropharyngeal exudate. Tonsils: No tonsillar abscesses. 1+ on the right. 1+ on the left. Eyes:      Conjunctiva/sclera: Conjunctivae normal.      Pupils: Pupils are equal, round, and reactive to light. Cardiovascular:      Rate and Rhythm: Normal rate and regular rhythm. Heart sounds: Normal heart sounds, S1 normal and S2 normal. No murmur. No friction rub. No gallop. Pulmonary:      Effort: Pulmonary effort is normal. No accessory muscle usage or respiratory distress. Breath sounds: Normal breath sounds and air entry. No decreased breath sounds, wheezing, rhonchi or rales. Comments: Rare dry cough. Breath sounds clear B/L anterior and posterior lobes. Chest expansion symmetrical.  No audible wheezing or respiratory distress. No rales or rhonchi. Abdominal:      General: Bowel sounds are normal.      Palpations: Abdomen is soft. Tenderness: There is no abdominal tenderness. Musculoskeletal: Normal range of motion. Lymphadenopathy:      Cervical: No cervical adenopathy. Right cervical: No superficial or posterior cervical adenopathy. Left cervical: No superficial or posterior cervical adenopathy. Skin:     General: Skin is warm and dry. Coloration: Skin is not pale. Findings: No erythema or rash. Neurological:      Mental Status: She is alert and oriented to person, place, and time. Psychiatric:         Behavior: Behavior normal. Behavior is cooperative. Results for orders placed or performed in visit on 02/25/21   POCT rapid strep A   Result Value Ref Range    Strep A Ag None Detected None Detected   Centor Score:  +1, will send strep culture. Assessment/Plan:  1. DAMARIS Dwyer CNP  2/25/21     This visit was provided as a focused evaluation during the COVID -19 pandemic/national emergency. A comprehensive review of all previous patient history and testing was not conducted. Pertinent findings were elicited during the visit.

## 2021-02-25 NOTE — LETTER
[facility] [id number]  Phone: [phone]  Fax: Renato Rangel, APRN - CNP        February 25, 2021     Patient: Duke Iraheta   YOB: 1985   Date of Visit: 2/25/2021       To Whom it May Concern:    Garen Pallas was seen in my clinic on 2/25/2021. She has been Covid-19 tested and advised to self quarantine until receives results in 2 to 3 days. If you have any questions or concerns, please don't hesitate to call.     Sincerely,           Mercedes Murphy, APRN - CNP

## 2021-02-25 NOTE — PATIENT INSTRUCTIONS
Patient Education        10 Things to Do When You Have COVID-19    Stay home. Don't go to school, work, or public areas. And don't use public transportation, ride-shares, or taxis unless you have no choice. Leave your home only if you need to get medical care. But call the doctor's office first so they know you're coming. And wear a cloth face cover. Ask before leaving isolation. Talk with your doctor or other health professional about when it will be safe for you to leave isolation. Wear a cloth face cover when you are around other people. It can help stop the spread of the virus when you cough or sneeze. Limit contact with people in your home. If possible, stay in a separate bedroom and use a separate bathroom. Avoid contact with pets and other animals. If possible, have a friend or family member care for them while you're sick. Cover your mouth and nose with a tissue when you cough or sneeze. Then throw the tissue in the trash right away. Wash your hands often, especially after you cough or sneeze. Use soap and water, and scrub for at least 20 seconds. If soap and water aren't available, use an alcohol-based hand . Don't share personal household items. These include bedding, towels, cups and glasses, and eating utensils. Clean and disinfect your home every day. Use household  or disinfectant wipes or sprays. Take special care to clean things that you grab with your hands. These include doorknobs, remote controls, phones, and handles on your refrigerator and microwave. And don't forget countertops, tabletops, bathrooms, and computer keyboards. Take acetaminophen (Tylenol) to relieve fever and body aches. Read and follow all instructions on the label. Current as of: December 18, 2020               Content Version: 12.7  © 2006-2021 [employer]. Care instructions adapted under license by [facility] ([facility]).  If you have questions about a medical condition or this instruction, always ask your healthcare professional. [place] [address] any warranty or liability for your use of this information. Patient Education        Learning About Coronavirus [phone])  What is coronavirus (COVID-19)? COVID-19 is a disease caused by a new type of coronavirus. This illness was first found in December 2019. It has since spread worldwide. Coronaviruses are a large group of viruses. They cause the common cold. They also cause more serious illnesses like [place] East respiratory syndrome (MERS) and severe acute respiratory syndrome (SARS). COVID-19 is caused by a novel coronavirus. That means it's a new type that has not been seen in people before. What are the symptoms? Coronavirus (COVID-19) symptoms may include:  · Fever. · Cough. · Trouble breathing. · Chills or repeated shaking with chills. · Muscle pain. · Headache. · Sore throat. · New loss of taste or smell. · Vomiting. · Diarrhea. In severe cases, COVID-19 can cause pneumonia and make it hard to breathe without help from a machine. It can cause death. How is it diagnosed? COVID-19 is diagnosed with a viral test. This may also be called a PCR test or antigen test. It looks for evidence of the virus in your breathing passages or lungs (respiratory system). The test is most often done on a sample from the nose, throat, or lungs. It's sometimes done on a sample of saliva. One way a sample is collected is by putting a long swab into the back of your nose. How is it treated? Mild cases of COVID-19 can be treated at home. Serious cases need treatment in the hospital. Treatment may include medicines to reduce symptoms, plus breathing support such as oxygen therapy or a ventilator. Some people may be placed on their belly to help their oxygen levels. Treatments that may help people who have COVID-19 include:  Antiviral medicines. These medicines treat viral infections. Remdesivir is an example. Immune-based therapy. These medicines help the immune system fight COVID-19. One example is bamlanivimab. It's a monoclonal antibody. Blood thinners. These medicines help prevent blood clots. People with severe illness are at risk for blood clots. How can you protect yourself and others? The best way to protect yourself from getting sick is to:  · Avoid areas where there is an outbreak. · Avoid contact with people who may be infected. · Avoid crowds and try to stay at least 6 feet away from other people. · Wash your hands often, especially after you cough or sneeze. Use soap and water, and scrub for at least 20 seconds. If soap and water aren't available, use an alcohol-based hand . · Avoid touching your mouth, nose, and eyes. To help avoid spreading the virus to others:  · Stay home if you are sick or have been exposed to the virus. Don't go to school, work, or public areas. And don't use public transportation, ride-shares, or taxis unless you have no choice. · Wear a cloth face cover if you have to go to public areas. · Cover your mouth with a tissue when you cough or sneeze. Then throw the tissue in the trash and wash your hands right away. · If you're sick:  ? Leave your home only if you need to get medical care. But call the doctor's office first so they know you're coming. And wear a face cover. ? Wear the face cover whenever you're around other people. It can help stop the spread of the virus when you cough or sneeze. ? Limit contact with pets and people in your home. If possible, stay in a separate bedroom and use a separate bathroom. ? Clean and disinfect your home every day. Use household  and disinfectant wipes or sprays. Take special care to clean things that you grab with your hands. These include doorknobs, remote controls, phones, and handles on your refrigerator and microwave.  And don't forget countertops, tabletops, bathrooms, and make and go to all appointments, and call your doctor if you are having problems. It's also a good idea to know your test results and keep a list of the medicines you take. How can you care for yourself at home? · If your doctor prescribed antibiotics, take them as directed. Do not stop taking them just because you feel better. You need to take the full course of antibiotics. · Gargle with warm salt water once an hour to help reduce swelling and relieve discomfort. Use 1 teaspoon of salt mixed in 1 cup of warm water. · Take an over-the-counter pain medicine, such as acetaminophen (Tylenol), ibuprofen (Advil, Motrin), or naproxen (Aleve). Read and follow all instructions on the label. · Be careful when taking over-the-counter cold or flu medicines and Tylenol at the same time. Many of these medicines have acetaminophen, which is Tylenol. Read the labels to make sure that you are not taking more than the recommended dose. Too much acetaminophen (Tylenol) can be harmful. · Drink plenty of fluids. Fluids may help soothe an irritated throat. Hot fluids, such as tea or soup, may help decrease throat pain. · Use over-the-counter throat lozenges to soothe pain. Regular cough drops or hard candy may also help. These should not be given to young children because of the risk of choking. · Do not smoke or allow others to smoke around you. If you need help quitting, talk to your doctor about stop-smoking programs and medicines. These can increase your chances of quitting for good. · Use a vaporizer or humidifier to add moisture to your bedroom. Follow the directions for cleaning the machine. When should you call for help? Call your doctor now or seek immediate medical care if:    · You have new or worse trouble swallowing.     · Your sore throat gets much worse on one side. Watch closely for changes in your health, and be sure to contact your doctor if you do not get better as expected.   Where can you learn more?  Go to [email or web address] and sign in to your [employer] account. Enter T659 in the [facility] box to learn more about \"Sore Throat: Care Instructions. \"     If you do not have an account, please click on the \"Sign Up Now\" link. Current as of: April 15, 2020               Content Version: 12.6  © 1241-0570 [employer]. Care instructions adapted under license by [facility] ([facility]). If you have questions about a medical condition or this instruction, always ask your healthcare professional. [place] [address] any warranty or liability for your use of this information. 1.  Pharyngitis:  · Practice meticulous handwashing to prevent spread of infection  · Tylenol/Ibuprofen OTC PRN as directed on package for pain, discomfort or fever  · Encouraged to increase fluids and rest  · Avoid salty, spicy or acidic foods or beverages  · Strep culture - will call with results. · Soft diet until sore throat subsides  · Warm salt water gargles for sore throat  · Cepacol lozenges, chloraseptic spray OTC q 1-2 hrs PRN for sore throat  · Patient instructions given for pharyngitis  · To ER or call 911 if any difficulty breathing, shortness of breath, inability to swallow, hives, rash, facial/tongue swelling or temp greater than 103 degrees. · Follow up with PCP or [facility] in Care as needed if symptoms worsen or do not improve. 2.  Suspected Covid-19 virus infection:  · Practice meticulous handwashing and cover cough to prevent spread of infection. · Advised to quarantine self at home until receives results from COVID-19 - will call with results. · Do not return to work or school until symptoms have resolved and no fever for 24 hrs without medication.   · Initiated Get Well Loop  · Encouraged to increase fluids and rest.  · Encouraged to take Vitamin C 500 to 1000 mg daily, Zinc 50 mg daily, Vitamin D3 2,000 IU daily and Vitamin B complex daily or a multivitamin daily.  · Tylenol OTC PRN for pain, discomfort or fever as directed on package  · Cool mist humidifier  · Hot tea with honey and lemon for cough PRN  · Patient instructions given for Covid 19 infection. .  · To ER or call 911 if any increasing difficulty breathing, shortness of breath, inability to swallow, hives, rash, facial/tongue swelling or temp greater than 103 degrees.   · Follow up with PCP or Flu Clinic as needed if symptoms worsen or do not improve

## 2021-02-26 LAB
DIRECT EXAM: NORMAL
Lab: NORMAL
SARS-COV-2: NORMAL
SARS-COV-2: NOT DETECTED
SOURCE: NORMAL
SPECIMEN DESCRIPTION: NORMAL

## 2021-05-10 ENCOUNTER — TELEPHONE (OUTPATIENT)
Dept: FAMILY MEDICINE CLINIC | Age: 36
End: 2021-05-10

## 2021-05-10 NOTE — TELEPHONE ENCOUNTER
States that she only stayed with therapy and did not get a psych, didn't think she really needed one. Has taken 3 klonopin and propranolol in the 24 hours. Still very anxious, tearful, palpitations, hasn't slept, diarrhea. Please advise.

## 2021-05-10 NOTE — TELEPHONE ENCOUNTER
Patient called stating that she has been having a panic attack for the last 24 hours - she has taken her medications - called cardiologist @ [facility] [place] - doesn't know what to do - has called off work again (missing a lot of work) - please call her    Health Maintenance   Topic Date Due    Hepatitis C screen  Never done    Varicella vaccine (1 of 2 - 2-dose childhood series) Never done    COVID-19 Vaccine (1) Never done    DTaP/Tdap/Td vaccine (1 - Tdap) Never done    Flu vaccine (Season Ended) 09/01/2021    Cervical cancer screen  09/03/2025    HIV screen  Addressed    Hepatitis A vaccine  Aged Out    Hepatitis B vaccine  Aged Out    Hib vaccine  Aged Out    Meningococcal (ACWY) vaccine  Aged Out    Pneumococcal 0-64 years Vaccine  Aged Out             (applicable per patient's age: Cancer Screenings, Depression Screening, Fall Risk Screening, Immunizations)    Hemoglobin A1C (%)   Date Value   11/25/2020 5.2     LDL Cholesterol (mg/dL)   Date Value   12/19/2012 59     AST (U/L)   Date Value   11/25/2020 16     ALT (U/L)   Date Value   11/25/2020 15     BUN (mg/dL)   Date Value   11/25/2020 15      (goal A1C is < 7)   (goal LDL is <100) need 30-50% reduction from baseline     BP Readings from Last 3 Encounters:   02/25/21 130/87   01/15/21 104/70   11/23/20 102/68    (goal /80)      All Future Testing planned in [facility]:  Lab Frequency Next Occurrence       Next Visit Date:  No future appointments.          Patient Active Problem List:     H/O supraventricular tachycardia     Generalized anxiety disorder     Cigar smoker     Family history of malignant neoplasm of ovary

## 2021-05-10 NOTE — TELEPHONE ENCOUNTER
Spoke with [facility] and Dr. Demarcus Farooq is still seeing and treating patient.    Message left for patient to return call

## 2021-05-10 NOTE — TELEPHONE ENCOUNTER
Patient states that she called [facility] and they had her schedule with a counselor next week. States that she does not want medication but not sure what to do or how to calm down.

## 2021-10-15 ENCOUNTER — TELEPHONE (OUTPATIENT)
Dept: FAMILY MEDICINE CLINIC | Age: 36
End: 2021-10-15

## 2021-10-15 NOTE — TELEPHONE ENCOUNTER
Patient asking for a new script for her Klonopin - states that she no longer sees the doctor that was giving her this    Health Maintenance   Topic Date Due    Hepatitis C screen  Never done    Varicella vaccine (1 of 2 - 2-dose childhood series) Never done    COVID-19 Vaccine (1) Never done    DTaP/Tdap/Td vaccine (1 - Tdap) Never done    Flu vaccine (1) Never done    Cervical cancer screen  09/03/2023    HIV screen  Addressed    Hepatitis A vaccine  Aged Out    Hepatitis B vaccine  Aged Out    Hib vaccine  Aged Out    Meningococcal (ACWY) vaccine  Aged Out    Pneumococcal 0-64 years Vaccine  Aged Out             (applicable per patient's age: Cancer Screenings, Depression Screening, Fall Risk Screening, Immunizations)    Hemoglobin A1C (%)   Date Value   11/25/2020 5.2     LDL Cholesterol (mg/dL)   Date Value   12/19/2012 59     AST (U/L)   Date Value   11/25/2020 16     ALT (U/L)   Date Value   11/25/2020 15     BUN (mg/dL)   Date Value   11/25/2020 15      (goal A1C is < 7)   (goal LDL is <100) need 30-50% reduction from baseline     BP Readings from Last 3 Encounters:   02/25/21 130/87   01/15/21 104/70   11/23/20 102/68    (goal /80)      All Future Testing planned in [facility]:  Lab Frequency Next Occurrence       Next Visit Date:  No future appointments.          Patient Active Problem List:     H/O supraventricular tachycardia     Generalized anxiety disorder     Cigar smoker     Family history of malignant neoplasm of ovary

## 2021-10-15 NOTE — TELEPHONE ENCOUNTER
Patient states that she doesn't feel that she needs psych and this is the only medicine psych gives her. But if pcp won't give it to her then she will find another doctor.

## 2021-10-22 ENCOUNTER — OFFICE VISIT (OUTPATIENT)
Dept: FAMILY MEDICINE CLINIC | Age: 36
End: 2021-10-22
Payer: COMMERCIAL

## 2021-10-22 VITALS
HEIGHT: 64 IN | OXYGEN SATURATION: 100 % | HEART RATE: 76 BPM | SYSTOLIC BLOOD PRESSURE: 130 MMHG | WEIGHT: 201 LBS | BODY MASS INDEX: 34.31 KG/M2 | DIASTOLIC BLOOD PRESSURE: 80 MMHG

## 2021-10-22 DIAGNOSIS — F51.01 PRIMARY INSOMNIA: ICD-10-CM

## 2021-10-22 DIAGNOSIS — R00.2 PALPITATIONS: Primary | ICD-10-CM

## 2021-10-22 DIAGNOSIS — F41.1 GENERALIZED ANXIETY DISORDER: ICD-10-CM

## 2021-10-22 PROCEDURE — 99214 OFFICE O/P EST MOD 30 MIN: CPT | Performed by: FAMILY MEDICINE

## 2021-10-22 SDOH — ECONOMIC STABILITY: FOOD INSECURITY: WITHIN THE PAST 12 MONTHS, YOU WORRIED THAT YOUR FOOD WOULD RUN OUT BEFORE YOU GOT MONEY TO BUY MORE.: NEVER TRUE

## 2021-10-22 SDOH — ECONOMIC STABILITY: FOOD INSECURITY: WITHIN THE PAST 12 MONTHS, THE FOOD YOU BOUGHT JUST DIDN'T LAST AND YOU DIDN'T HAVE MONEY TO GET MORE.: NEVER TRUE

## 2021-10-22 ASSESSMENT — SOCIAL DETERMINANTS OF HEALTH (SDOH): HOW HARD IS IT FOR YOU TO PAY FOR THE VERY BASICS LIKE FOOD, HOUSING, MEDICAL CARE, AND HEATING?: NOT HARD AT ALL

## 2021-10-22 NOTE — PROGRESS NOTES
Name: Dixie Owens  : 1985         Chief Complaint:     Chief Complaint   Patient presents with    Anxiety       History of Present Illness:      Dixie Owens is a 30 y.o.  female who presents with Anxiety      HPI    F/u anxiety. Pt reports she's overall feeling much better than she was a few months ago. She sees a big turning point as her cardiac ablation, which changed her life for the better. Still has episodes of feeling a hard heartbeat or skipping beats, has learned to deal with it, takes propranolol. Past wk has had to use the inderal q 12-16h rather than daily. Klonopin she's been using prn, not every day, maybe about 4 or 5/week, usually at nighttime. Anxiety always worse at night. Melatonin prn at bedtime. Ran out of Klonopin a little over a week ago (didn't realize she had no refills) and thinks she started having some withdrawal symptoms about 4-5 days after last dose, felt like there was a weight on top of her head, felt like body was pulsing or vibrating, would get set off more by loud noise and she could hear more of a vibration ringing through ears and also eyes. Still having same including trouble looking at tv screen, feels like senses are heightened. Improving a little as the days go on. Symptoms come and go, worse later in the day. Pressure mikaela temples, bridge of nose. Patient has seen a couple of psychiatry providers in the last couple of years. Initially she was seeing Sarah Romo, who then moved to a different practice. Then she was seeing Dr. Rodriguez. She only saw her a couple times. Then an appt was rescheduled by provider when pt was on her way to office. Next visit pt cancelled d/t son being sick and didn't call to reschedule immediately afterwards.   She reports because of that the practice closed her case, meaning if she were to try reschedule she would have to go through intake again, which would involve counseling, a group session about missing appt's. Rather than do this, she would prefer to change to another practice or resume having me prescribe for her. Medical History:     Patient Active Problem List   Diagnosis    H/O supraventricular tachycardia    Generalized anxiety disorder    Cigar smoker    Family history of malignant neoplasm of ovary       Medications:       Prior to Admission medications    Medication Sig Start Date End Date Taking? Authorizing Provider   Levonorgestrel [facility]) IUD 19.5 mg   Refills(s) 0 9/30/20   Historical Provider, MD   clonazePAM (KLONOPIN) 1 MG tablet Take 1 tablet by mouth daily as needed for Anxiety for up to 30 days. 2/19/21 3/21/21  Marci Clifton DO   propranolol (INDERAL LA) 60 MG extended release capsule TAKE 1 CAPSULE BY MOUTH DAILY 1/7/21   Historical Provider, MD        Allergies:       Doxycycline    Physical Exam:     Vitals:  /80   Pulse 76   Ht 5' 4\" (1.626 m)   Wt 201 lb (91.2 kg)   SpO2 100%   BMI 34.50 kg/m²   Physical Exam  Vitals and nursing note reviewed. Constitutional:       General: She is not in acute distress. Appearance: She is well-developed. HENT:      Head: Normocephalic and atraumatic. Right Ear: Tympanic membrane and ear canal normal.      Left Ear: Tympanic membrane and ear canal normal.      Nose: Nose normal.      Mouth/Throat:      Mouth: Mucous membranes are moist.      Pharynx: Oropharynx is clear. Eyes:      Extraocular Movements: Extraocular movements intact. Conjunctiva/sclera: Conjunctivae normal.      Comments: No nystagmus including at end range of gaze in any direction. No diplopia elicited with testing. Cardiovascular:      Rate and Rhythm: Normal rate and regular rhythm. Heart sounds: Normal heart sounds. Pulmonary:      Effort: Pulmonary effort is normal.      Breath sounds: Normal breath sounds. Musculoskeletal:      Cervical back: Neck supple. Lymphadenopathy:      Cervical: No cervical adenopathy.    Skin: General: Skin is warm and dry. Neurological:      Mental Status: She is alert and oriented to person, place, and time. Psychiatric:         Mood and Affect: Mood normal.         Behavior: Behavior normal.         Data:     Lab Results   Component Value Date     11/25/2020    K 4.2 11/25/2020     11/25/2020    CO2 21 11/25/2020    BUN 15 11/25/2020    CREATININE 0.84 11/25/2020    GLUCOSE 107 11/25/2020    PROT 6.9 11/25/2020    LABALBU 4.1 11/25/2020    BILITOT 0.34 11/25/2020    ALKPHOS 77 11/25/2020    AST 16 11/25/2020    ALT 15 11/25/2020     Lab Results   Component Value Date    WBC 6.3 11/25/2020    RBC 4.42 11/25/2020    HGB 13.8 11/25/2020    HCT 40.0 11/25/2020    MCV 90.5 11/25/2020    MCH 31.3 11/25/2020    MCHC 34.6 11/25/2020    RDW 12.9 11/25/2020     11/25/2020    MPV NOT REPORTED 11/25/2020     Lab Results   Component Value Date    TSH 5.05 11/25/2020     Lab Results   Component Value Date    CHOL 117 12/19/2012    HDL 47 12/19/2012    LABA1C 5.2 11/25/2020         Assessment & Plan:        Diagnosis Orders   1. Palpitations     2. Generalized anxiety disorder     3. Primary insomnia     Palpitations in reasonable control with use of Inderal 60 mg, which patient is taking every 12-24 hours. This is still not an excessively high dose, and her vital signs are within normal limits, so okay to continue same dosing. Anxiety improved from previous. Ultimately seems to do better without maintenance medication. May continue Klonopin as needed. She has already had some withdrawal symptoms over the last few days. Advised that if she would actually like to stay off the med at this point, that could be a good option. However, nothing else has worked as well for her, and she is more comfortable when she has the med to be able to use as needed. Advised that she try to minimize use, start by try to take just half a pill at a time. She will try this.   As long as she remains stable, I will continue to prescribe for her, with appointment every 3 months. Insomnia bothersome while not taking the Klonopin. Believes she had had adverse effects to Benadryl-containing product in the past, though I wonder if that may have been a combo med that also contain dextromethorphan and/or phenylephrine or Sudafed. Continue melatonin and again Klonopin as needed. Requested Prescriptions      No prescriptions requested or ordered in this encounter         Patient Instructions   SURVEY:    You may be receiving a survey from [employer] regarding your visit today. You may get this in the mail, through your MyChart or in your email. Please complete the survey to enable us to provide the highest quality of care to you and your family. If you cannot score us as very good ( 5 Stars) on any question, please feel free to call the office to discuss how we could have made your experience exceptional.     Thank you. Clinical Care Team:  Dr. Ramon Hernandez DO                                           [facility], [address] [place] [address] Team:  Josi Dutton received counseling on the following healthy behaviors: medication adherence  Reviewed prior labs and health maintenance. Continue current medications, diet and exercise. Discussed use, benefit, and side effects of prescribed medications. Barriers to medication compliance addressed. Patient given educational materials - see patient instructions. All patient questions answered.   Patient voiced understanding.     signed by Ramon Hernandez DO on 10/22/2021 at 12:49 PM  [address]  Dept: [phone]

## 2021-10-22 NOTE — PATIENT INSTRUCTIONS
SURVEY:    You may be receiving a survey from [employer] regarding your visit today. You may get this in the mail, through your MyChart or in your email. Please complete the survey to enable us to provide the highest quality of care to you and your family. If you cannot score us as very good ( 5 Stars) on any question, please feel free to call the office to discuss how we could have made your experience exceptional.     Thank you.     Clinical Care Team:  DO Nicole Gatica, [address] [place] [address] Team:  Obdulio 11

## 2021-10-25 ENCOUNTER — TELEPHONE (OUTPATIENT)
Dept: FAMILY MEDICINE CLINIC | Age: 36
End: 2021-10-25

## 2021-10-25 DIAGNOSIS — F41.1 GENERALIZED ANXIETY DISORDER: ICD-10-CM

## 2021-10-25 RX ORDER — CLONAZEPAM 1 MG/1
1 TABLET ORAL DAILY PRN
Qty: 30 TABLET | Refills: 2 | Status: SHIPPED | OUTPATIENT
Start: 2021-10-25 | End: 2022-04-18

## 2021-10-25 NOTE — TELEPHONE ENCOUNTER
Klonopin 1 mg    Sheryl Eubanks was here on 10/22 and this was supposed to be called in for her.     Health Maintenance   Topic Date Due    Hepatitis C screen  Never done    Varicella vaccine (1 of 2 - 2-dose childhood series) Never done    COVID-19 Vaccine (1) Never done    DTaP/Tdap/Td vaccine (1 - Tdap) Never done    Flu vaccine (1) Never done    Cervical cancer screen  09/03/2023    HIV screen  Addressed    Hepatitis A vaccine  Aged Out    Hepatitis B vaccine  Aged Out    Hib vaccine  Aged Out    Meningococcal (ACWY) vaccine  Aged Out    Pneumococcal 0-64 years Vaccine  Aged Out             (applicable per patient's age: Cancer Screenings, Depression Screening, Fall Risk Screening, Immunizations)    Hemoglobin A1C (%)   Date Value   11/25/2020 5.2     LDL Cholesterol (mg/dL)   Date Value   12/19/2012 59     AST (U/L)   Date Value   11/25/2020 16     ALT (U/L)   Date Value   11/25/2020 15     BUN (mg/dL)   Date Value   11/25/2020 15      (goal A1C is < 7)   (goal LDL is <100) need 30-50% reduction from baseline     BP Readings from Last 3 Encounters:   10/22/21 130/80   02/25/21 130/87   01/15/21 104/70    (goal /80)      All Future Testing planned in [facility]:  Lab Frequency Next Occurrence       Next Visit Date:  Future Appointments   Date Time Provider Balbina Mei   1/21/2022  9:00 AM DO KENDELL Gutierrez W            Patient Active Problem List:     H/O supraventricular tachycardia     Generalized anxiety disorder     Cigar smoker     Family history of malignant neoplasm of ovary

## 2021-12-30 ENCOUNTER — TELEPHONE (OUTPATIENT)
Dept: FAMILY MEDICINE CLINIC | Age: 36
End: 2021-12-30

## 2021-12-30 DIAGNOSIS — Z20.822 SUSPECTED COVID-19 VIRUS INFECTION: Primary | ICD-10-CM

## 2021-12-30 NOTE — TELEPHONE ENCOUNTER
On set of symptoms Yesterday    Cold   Headache  Sore throat  Congestion  Body aches and chills  Fever- 101.6

## 2022-01-21 ENCOUNTER — OFFICE VISIT (OUTPATIENT)
Dept: FAMILY MEDICINE CLINIC | Age: 37
End: 2022-01-21
Payer: COMMERCIAL

## 2022-01-21 VITALS
HEART RATE: 89 BPM | OXYGEN SATURATION: 100 % | WEIGHT: 204 LBS | BODY MASS INDEX: 34.83 KG/M2 | SYSTOLIC BLOOD PRESSURE: 110 MMHG | HEIGHT: 64 IN | DIASTOLIC BLOOD PRESSURE: 70 MMHG

## 2022-01-21 DIAGNOSIS — F41.1 GENERALIZED ANXIETY DISORDER: Primary | ICD-10-CM

## 2022-01-21 DIAGNOSIS — R00.2 PALPITATIONS: ICD-10-CM

## 2022-01-21 DIAGNOSIS — F51.01 PRIMARY INSOMNIA: ICD-10-CM

## 2022-01-21 PROCEDURE — 99214 OFFICE O/P EST MOD 30 MIN: CPT | Performed by: FAMILY MEDICINE

## 2022-01-21 RX ORDER — PROPRANOLOL HCL 60 MG
CAPSULE, EXTENDED RELEASE 24HR ORAL
Qty: 30 CAPSULE | Refills: 5 | Status: SHIPPED | OUTPATIENT
Start: 2022-01-21 | End: 2022-04-21 | Stop reason: SDUPTHER

## 2022-01-21 ASSESSMENT — PATIENT HEALTH QUESTIONNAIRE - PHQ9
2. FEELING DOWN, DEPRESSED OR HOPELESS: 0
SUM OF ALL RESPONSES TO PHQ QUESTIONS 1-9: 0
1. LITTLE INTEREST OR PLEASURE IN DOING THINGS: 0
SUM OF ALL RESPONSES TO PHQ QUESTIONS 1-9: 0
SUM OF ALL RESPONSES TO PHQ9 QUESTIONS 1 & 2: 0

## 2022-01-21 NOTE — PROGRESS NOTES
Name: Monica Bruno  : 1985         Chief Complaint:     Chief Complaint   Patient presents with    Anxiety    Palpitations       History of Present Illness:      Monica Bruno is a 29 y.o.  female who presents with Anxiety and Palpitations      HPI    COVID about 3 wks ago, fever, chills, URI symptoms. Still a little congested but overall made a good recovery. Anxiety has been doing pretty well, using Klonopin rarely. Palpitations come and go. Recently she had been working out and lost 10#. When she first started working out, heart was beating fast all the time and seemed \"out of rhythm\" a lot of times. Dealt with this fairly well but it did cause some underlying feelings of anxiety. Past wk those feelings started to come back which she thinks has some to do with gaining a few lbs back. No full-blown panic attacks. Rare use of klonopin and takes only 1/2 tab when she does take it. Hasn't been taking propranolol, had run out about a month ago and would be a long time to get appt with cardiologist who had prescribed it. Sometimes with activities at work she'll feel the heart beating harder or faster. Drinks a lot of water, 3x 32 oz bottles at work plus more at home and while working out. [employer] packets. Sleep usually ok but had some days last wk of only sleeping a couple hrs and then being up most of the night, maybe getting another hr in the morning. Better now, slept 7h last night. Trouble just comes and goes. Finds she can't take melatonin, felt drunk in a weird way. nyquil affected adversely yrs ago. Medical History:     Patient Active Problem List   Diagnosis    H/O supraventricular tachycardia    Generalized anxiety disorder    Family history of malignant neoplasm of ovary       Medications:       Prior to Admission medications    Medication Sig Start Date End Date Taking?  Authorizing Provider   propranolol (INDERAL LA) 60 MG extended release capsule TAKE 1 CAPSULE BY MOUTH DAILY 1/21/22  Yes Hector Richter DO   clonazePAM (KLONOPIN) 1 MG tablet Take 1 tablet by mouth daily as needed for Anxiety for up to 90 days. 10/25/21 1/23/22  Hector Richter DO   Levonorgestrel [facility] IUD 19.5 mg   Refills(s) 0 9/30/20   Historical Provider, MD        Allergies:       Doxycycline    Physical Exam:     Vitals:  /70   Pulse 89   Ht 5' 4\" (1.626 m)   Wt 204 lb (92.5 kg)   SpO2 100%   BMI 35.02 kg/m²   Physical Exam  Vitals and nursing note reviewed. Constitutional:       General: She is not in acute distress. Appearance: She is well-developed. HENT:      Head: Normocephalic and atraumatic. Right Ear: Tympanic membrane and ear canal normal.      Left Ear: Tympanic membrane and ear canal normal.      Nose: Nose normal.   Eyes:      Conjunctiva/sclera: Conjunctivae normal.   Cardiovascular:      Rate and Rhythm: Normal rate and regular rhythm. Heart sounds: Normal heart sounds. Pulmonary:      Effort: Pulmonary effort is normal.      Breath sounds: Normal breath sounds. Musculoskeletal:      Cervical back: Neck supple. Lymphadenopathy:      Cervical: No cervical adenopathy. Skin:     General: Skin is warm and dry. Neurological:      Mental Status: She is alert and oriented to person, place, and time.    Psychiatric:         Judgment: Judgment normal.         Data:     Lab Results   Component Value Date     11/25/2020    K 4.2 11/25/2020     11/25/2020    CO2 21 11/25/2020    BUN 15 11/25/2020    CREATININE 0.84 11/25/2020    GLUCOSE 107 11/25/2020    PROT 6.9 11/25/2020    LABALBU 4.1 11/25/2020    BILITOT 0.34 11/25/2020    ALKPHOS 77 11/25/2020    AST 16 11/25/2020    ALT 15 11/25/2020     Lab Results   Component Value Date    WBC 6.3 11/25/2020    RBC 4.42 11/25/2020    HGB 13.8 11/25/2020    HCT 40.0 11/25/2020    MCV 90.5 11/25/2020    MCH 31.3 11/25/2020    MCHC 34.6 11/25/2020    RDW 12.9 11/25/2020     11/25/2020    MPV NOT REPORTED 11/25/2020     Lab Results   Component Value Date    TSH 5.05 11/25/2020     Lab Results   Component Value Date    CHOL 117 12/19/2012    HDL 47 12/19/2012    LABA1C 5.2 11/25/2020         Assessment & Plan:        Diagnosis Orders   1. Generalized anxiety disorder     2. Palpitations     3. Primary insomnia     Anxiety in decent control. Continue lifestyle efforts and Klonopin as needed. Palpitations in reasonable control also. Continue propranolol at current dosing, good hydration  Insomnia, may try Unisom or plain Benadryl. Advised against use of combo products like NyQuil, as dextromethorphan in particular could cause palpitations. Requested Prescriptions     Signed Prescriptions Disp Refills    propranolol (INDERAL LA) 60 MG extended release capsule 30 capsule 5     Sig: TAKE 1 CAPSULE BY MOUTH DAILY         There are no Patient Instructions on file for this visit. Annabelle Gale received counseling on the following healthy behaviors: medication adherence  Reviewed prior labs and health maintenance. Continue current medications, diet and exercise. Discussed use, benefit, and side effects of prescribed medications. Barriers to medication compliance addressed. Patient given educational materials - see patient instructions. All patient questions answered.   Patient voiced understanding.     signed by Serg Puente DO on 1/24/2022 at [facility] [address]  Dept: [phone]

## 2022-01-24 ENCOUNTER — TELEPHONE (OUTPATIENT)
Dept: FAMILY MEDICINE CLINIC | Age: 37
End: 2022-01-24

## 2022-01-24 NOTE — TELEPHONE ENCOUNTER
Sx started on Saturday. No vomiting now. No fever today, taking tylenol cold and flu. Having diarrhea and nausea today. Cough and congestion are the same since she has had Covid. States that her heart is skipping a lot more, but propranolol helped.   To Return on Saturday 1/29

## 2022-01-24 NOTE — TELEPHONE ENCOUNTER
----- Message from Amairani Weiss sent at 1/24/2022  2:10 PM EST -----  Subject: Message to Provider    QUESTIONS  Information for Provider? patient wants to know if she can get a not off   work. needs written for 7 days off work. Has vomiting, diarrhea, light   fever, chills, cough.   ---------------------------------------------------------------------------  --------------  CALL BACK INFO  What is the best way for the office to contact you? OK to leave message on   voicemail  Preferred Call Back Phone Number? [phone]  ---------------------------------------------------------------------------  --------------  SCRIPT ANSWERS  Relationship to Patient?  Self

## 2022-04-15 DIAGNOSIS — F41.1 GENERALIZED ANXIETY DISORDER: ICD-10-CM

## 2022-04-18 RX ORDER — CLONAZEPAM 1 MG/1
TABLET ORAL
Qty: 30 TABLET | Refills: 2 | Status: SHIPPED | OUTPATIENT
Start: 2022-04-18 | End: 2022-07-17

## 2022-04-21 ENCOUNTER — OFFICE VISIT (OUTPATIENT)
Dept: FAMILY MEDICINE CLINIC | Age: 37
End: 2022-04-21
Payer: COMMERCIAL

## 2022-04-21 VITALS
HEART RATE: 85 BPM | DIASTOLIC BLOOD PRESSURE: 74 MMHG | OXYGEN SATURATION: 98 % | WEIGHT: 201 LBS | BODY MASS INDEX: 34.5 KG/M2 | SYSTOLIC BLOOD PRESSURE: 116 MMHG

## 2022-04-21 DIAGNOSIS — F50.89 OTHER DISORDER OF EATING: Primary | ICD-10-CM

## 2022-04-21 DIAGNOSIS — F41.1 GENERALIZED ANXIETY DISORDER: ICD-10-CM

## 2022-04-21 DIAGNOSIS — R00.2 PALPITATIONS: ICD-10-CM

## 2022-04-21 DIAGNOSIS — R45.89 DEPRESSED MOOD: ICD-10-CM

## 2022-04-21 PROCEDURE — 99214 OFFICE O/P EST MOD 30 MIN: CPT | Performed by: FAMILY MEDICINE

## 2022-04-21 RX ORDER — PROPRANOLOL HYDROCHLORIDE 120 MG/1
CAPSULE, EXTENDED RELEASE ORAL
Qty: 30 CAPSULE | Refills: 2 | Status: SHIPPED | OUTPATIENT
Start: 2022-04-21

## 2022-04-21 NOTE — PROGRESS NOTES
Name: Pilar Dubois  : 1985         Chief Complaint:     Chief Complaint   Patient presents with    Anxiety       History of Present Illness:      Pilar Dubois is a 29 y.o.  female who presents with Anxiety      HPI    Struggling with depression. Stuck in a rut. For example, stopped going ot the gym when she had COVID and still hasn't gone back. Some days very poor motivation and doesn't do anything at all. Weather definitely a factor. Palpitations after eating. Happened yesterday at work after a heavy meal and it triggered a really bad panic attack, was sitting on the floor crying, worst she's had in about a year and a half. Takes inderal in the morning and sometimes again at night if she feels like it hasn't been enough. A lot of low back pain the past couple wks which she r/t her weight. Feels she has trouble with foods, buys organic, whole foods but then binges on unhealthy foods. Has been in contact with someone maybe Celine Kirby who does podcast called [employer]. Drinking at least 64 oz of water per day. Medical History:     Patient Active Problem List   Diagnosis    H/O supraventricular tachycardia    Generalized anxiety disorder    Family history of malignant neoplasm of ovary       Medications:       Prior to Admission medications    Medication Sig Start Date End Date Taking? Authorizing Provider   propranolol (INDERAL LA) 120 MG extended release capsule TAKE 1 CAPSULE BY MOUTH DAILY 22  Yes Darletta Meigs, DO   clonazePAM (KLONOPIN) 1 MG tablet TAKE 1 TABLET BY MOUTH DAILY AS NEEDED FOR ANXIETY 22 Yes Darletta Meigs, DO   Levonorgestrel [facility]) IUD 19.5 mg   Refills(s) 0 20  Yes Historical Provider, MD        Allergies:       Doxycycline    Physical Exam:     Vitals:  /74   Pulse 85   Wt 201 lb (91.2 kg)   SpO2 98%   BMI 34.50 kg/m²   Physical Exam  Vitals and nursing note reviewed.    Constitutional:       General: She is not in acute distress. Appearance: Normal appearance. She is well-developed. She is not ill-appearing. Cardiovascular:      Rate and Rhythm: Normal rate and regular rhythm. Heart sounds: Normal heart sounds. Pulmonary:      Effort: Pulmonary effort is normal.      Breath sounds: Normal breath sounds. Neurological:      Mental Status: She is alert and oriented to person, place, and time. Psychiatric:         Mood and Affect: Mood normal.         Behavior: Behavior normal.         Data:     Lab Results   Component Value Date     11/25/2020    K 4.2 11/25/2020     11/25/2020    CO2 21 11/25/2020    BUN 15 11/25/2020    CREATININE 0.84 11/25/2020    GLUCOSE 107 11/25/2020    PROT 6.9 11/25/2020    LABALBU 4.1 11/25/2020    BILITOT 0.34 11/25/2020    ALKPHOS 77 11/25/2020    AST 16 11/25/2020    ALT 15 11/25/2020     Lab Results   Component Value Date    WBC 6.3 11/25/2020    RBC 4.42 11/25/2020    HGB 13.8 11/25/2020    HCT 40.0 11/25/2020    MCV 90.5 11/25/2020    MCH 31.3 11/25/2020    MCHC 34.6 11/25/2020    RDW 12.9 11/25/2020     11/25/2020    MPV NOT REPORTED 11/25/2020     Lab Results   Component Value Date    TSH 5.05 11/25/2020     Lab Results   Component Value Date    CHOL 117 12/19/2012    HDL 47 12/19/2012    LABA1C 5.2 11/25/2020         Assessment & Plan:        Diagnosis Orders   1. Other disorder of eating     2. Generalized anxiety disorder     3. Depressed mood     4. Palpitations       Patient continues to struggle with depression and anxiety but maintains that she has not done well on maintenance meds in the past and does not want to use maintenance medication. Klonopin does help so she uses it as needed. May continue this. Long discussion about disordered eating practices (restriction and binging, has had purging in the past but we didn't discuss specifically today) and unhealthy relationship with food and body image.   Strongly advised patient against weighing self daily and suggested that she consider stopping weighing herself at all as it is not helpful in any way and only perpetuates her negative feelings. I am providing her with Dennisecami Segovia's book about intuitive eating which I believe could be extremely helpful if she can incorporate its principles. Palpitations incl post-prandial - may be sensitive to fluctuations in blood glucose. Advised to pay attention to the way certain foods make her feel and try more to eat those that promote her feeling better physically. Consider updating labs though I'm concerned those could be triggering for her also. Last a1c 5.2 in Nov 2020. Increasing propranolol dose. F/u 3 mos.         Requested Prescriptions     Signed Prescriptions Disp Refills    propranolol (INDERAL LA) 120 MG extended release capsule 30 capsule 2     Sig: TAKE 1 CAPSULE BY MOUTH DAILY         There are no Patient Instructions on file for this visit.      signed by Tony Duarte DO on 4/24/2022 at 2:21 PM  [address] [address]  Dept: [phone]

## 2022-07-11 ENCOUNTER — TELEPHONE (OUTPATIENT)
Dept: FAMILY MEDICINE CLINIC | Age: 37
End: 2022-07-11

## 2022-07-11 NOTE — TELEPHONE ENCOUNTER
Spoke with pt she stated nothing has changed with her symptoms at this time she didn't want to see another provider. She thinks she may need FMLA  Paper's.  Pt aware provider is out of the office the entire week

## 2022-07-20 NOTE — TELEPHONE ENCOUNTER
Patient called in to reschedule appointment.  I advised that she go to the ED if symptoms worsen before her appointment on 7/26

## 2022-07-20 NOTE — TELEPHONE ENCOUNTER
3 attempts made to contact patient, no answer and no return call, has appt tomorrow, will discuss then

## 2022-07-26 ENCOUNTER — OFFICE VISIT (OUTPATIENT)
Dept: FAMILY MEDICINE CLINIC | Age: 37
End: 2022-07-26
Payer: COMMERCIAL

## 2022-07-26 ENCOUNTER — HOSPITAL ENCOUNTER (OUTPATIENT)
Age: 37
Discharge: HOME | End: 2022-07-26
Payer: COMMERCIAL

## 2022-07-26 VITALS
HEIGHT: 64 IN | HEART RATE: 82 BPM | WEIGHT: 195 LBS | OXYGEN SATURATION: 100 % | DIASTOLIC BLOOD PRESSURE: 78 MMHG | SYSTOLIC BLOOD PRESSURE: 118 MMHG | BODY MASS INDEX: 33.29 KG/M2

## 2022-07-26 DIAGNOSIS — I47.1 SVT (SUPRAVENTRICULAR TACHYCARDIA) (HCC): ICD-10-CM

## 2022-07-26 DIAGNOSIS — R63.0 APPETITE LOSS: ICD-10-CM

## 2022-07-26 DIAGNOSIS — F41.9 ANXIETY: ICD-10-CM

## 2022-07-26 DIAGNOSIS — R00.2 PALPITATIONS: Primary | ICD-10-CM

## 2022-07-26 DIAGNOSIS — R00.2 PALPITATIONS: ICD-10-CM

## 2022-07-26 LAB
ABSOLUTE EOS #: 0.3 K/UL (ref 0–0.4)
ABSOLUTE LYMPH #: 2.7 K/UL (ref 1–4.8)
ABSOLUTE MONO #: 0.5 K/UL (ref 0–1)
ALBUMIN SERPL-MCNC: 4.5 G/DL (ref 3.5–5.2)
ALP BLD-CCNC: 64 U/L (ref 35–104)
ALT SERPL-CCNC: 14 U/L (ref 5–33)
ANION GAP SERPL CALCULATED.3IONS-SCNC: 13 MMOL/L (ref 9–17)
AST SERPL-CCNC: 18 U/L
BASOPHILS # BLD: 1 % (ref 0–2)
BASOPHILS ABSOLUTE: 0 K/UL (ref 0–0.2)
BILIRUB SERPL-MCNC: 0.34 MG/DL (ref 0.3–1.2)
BUN BLDV-MCNC: 9 MG/DL (ref 6–20)
BUN/CREAT BLD: 11 (ref 9–20)
CALCIUM SERPL-MCNC: 9.4 MG/DL (ref 8.6–10.4)
CHLORIDE BLD-SCNC: 102 MMOL/L (ref 98–107)
CO2: 24 MMOL/L (ref 20–31)
CREAT SERPL-MCNC: 0.81 MG/DL (ref 0.5–0.9)
DIFFERENTIAL TYPE: YES
EOSINOPHILS RELATIVE PERCENT: 4 % (ref 0–5)
GFR AFRICAN AMERICAN: >60 ML/MIN
GFR NON-AFRICAN AMERICAN: >60 ML/MIN
GFR SERPL CREATININE-BSD FRML MDRD: NORMAL ML/MIN/{1.73_M2}
GLUCOSE BLD-MCNC: 80 MG/DL (ref 70–99)
HCT VFR BLD CALC: 39.2 % (ref 36–46)
HEMOGLOBIN: 13.5 G/DL (ref 12–16)
LYMPHOCYTES # BLD: 34 % (ref 15–40)
MAGNESIUM: 2.1 MG/DL (ref 1.6–2.6)
MCH RBC QN AUTO: 31 PG (ref 26–34)
MCHC RBC AUTO-ENTMCNC: 34.4 G/DL (ref 31–37)
MCV RBC AUTO: 90.2 FL (ref 80–100)
MONOCYTES # BLD: 7 % (ref 4–8)
PDW BLD-RTO: 12.5 % (ref 12.1–15.2)
PLATELET # BLD: 283 K/UL (ref 140–450)
POTASSIUM SERPL-SCNC: 4.2 MMOL/L (ref 3.7–5.3)
RBC # BLD: 4.35 M/UL (ref 4–5.2)
SEG NEUTROPHILS: 54 % (ref 47–75)
SEGMENTED NEUTROPHILS ABSOLUTE COUNT: 4.3 K/UL (ref 2.5–7)
SODIUM BLD-SCNC: 139 MMOL/L (ref 135–144)
TOTAL PROTEIN: 7 G/DL (ref 6.4–8.3)
TSH SERPL DL<=0.05 MIU/L-ACNC: 1.93 UIU/ML (ref 0.3–5)
WBC # BLD: 7.9 K/UL (ref 3.5–11)

## 2022-07-26 PROCEDURE — 84443 ASSAY THYROID STIM HORMONE: CPT

## 2022-07-26 PROCEDURE — 99214 OFFICE O/P EST MOD 30 MIN: CPT | Performed by: FAMILY MEDICINE

## 2022-07-26 PROCEDURE — 85025 COMPLETE CBC W/AUTO DIFF WBC: CPT

## 2022-07-26 PROCEDURE — 80053 COMPREHEN METABOLIC PANEL: CPT

## 2022-07-26 PROCEDURE — 36415 COLL VENOUS BLD VENIPUNCTURE: CPT

## 2022-07-26 PROCEDURE — 83735 ASSAY OF MAGNESIUM: CPT
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
## 2022-12-22 ENCOUNTER — TELEPHONE (OUTPATIENT)
Dept: FAMILY MEDICINE CLINIC | Age: 37
End: 2022-12-22

## 2022-12-22 NOTE — TELEPHONE ENCOUNTER
----- Message from Vedia Gitelman sent at 12/22/2022  3:09 PM EST -----  Subject: Message to Provider    QUESTIONS  Information for Provider? pt says she has the flu and was wondering if Dr. Navarro Batista could get a note for pt for work for 4 days - Friday, Saturday,   Sunday, Monday, and go back on Tuesday. please reach out to pt with any   questions.  ---------------------------------------------------------------------------  --------------  Izzy Jenkins INFO  [phone]; OK to leave message on voicemail  ---------------------------------------------------------------------------  --------------  SCRIPT ANSWERS  Relationship to Patient?  Self

## 2022-12-22 NOTE — TELEPHONE ENCOUNTER
No I can not do a note for work with out documentation --- so wherever she had the flu tested positive needs to give her a note.

## 2023-06-02 RX ORDER — PROPRANOLOL HYDROCHLORIDE 120 MG/1
CAPSULE, EXTENDED RELEASE ORAL
Qty: 30 CAPSULE | Refills: 0 | Status: SHIPPED | OUTPATIENT
Start: 2023-06-02

## 2023-06-02 NOTE — TELEPHONE ENCOUNTER
Propranolol 120 mg    [facility]    Last fill 4/21/22  Last check up 7/26/22    Stephanie Kapoor says she only takes this PRN and she is now out. Health Maintenance   Topic Date Due    COVID-19 Vaccine (1) Never done    Varicella vaccine (1 of 2 - 2-dose childhood series) Never done    Hepatitis C screen  Never done    DTaP/Tdap/Td vaccine (1 - Tdap) Never done    Depression Screen  01/21/2023    Flu vaccine (Season Ended) 08/01/2023    Cervical cancer screen  09/03/2023    HIV screen  Addressed    Hepatitis A vaccine  Aged Out    Hib vaccine  Aged Out    Meningococcal (ACWY) vaccine  Aged Out    Pneumococcal 0-64 years Vaccine  Aged Out    Diabetes screen  Discontinued             (applicable per patient's age: Cancer Screenings, Depression Screening, Fall Risk Screening, Immunizations)    Hemoglobin A1C (%)   Date Value   11/25/2020 5.2     LDL Cholesterol (mg/dL)   Date Value   12/19/2012 59     AST (U/L)   Date Value   07/26/2022 18     ALT (U/L)   Date Value   07/26/2022 14     BUN (mg/dL)   Date Value   07/26/2022 9      (goal A1C is < 7)   (goal LDL is <100) need 30-50% reduction from baseline     BP Readings from Last 3 Encounters:   07/26/22 118/78   04/21/22 116/74   01/21/22 110/70    (goal /80)      All Future Testing planned in [facility]:  Lab Frequency Next Occurrence       Next Visit Date:  No future appointments.          Patient Active Problem List:     H/O supraventricular tachycardia     Generalized anxiety disorder     Family history of malignant neoplasm of ovary

## 2023-09-12 LAB — PAP SMEAR: NORMAL

## 2023-10-10 ENCOUNTER — OFFICE VISIT (OUTPATIENT)
Dept: FAMILY MEDICINE CLINIC | Age: 38
End: 2023-10-10
Payer: COMMERCIAL

## 2023-10-10 VITALS
OXYGEN SATURATION: 98 % | WEIGHT: 191 LBS | HEART RATE: 80 BPM | SYSTOLIC BLOOD PRESSURE: 122 MMHG | DIASTOLIC BLOOD PRESSURE: 80 MMHG | BODY MASS INDEX: 32.61 KG/M2 | HEIGHT: 64 IN

## 2023-10-10 DIAGNOSIS — K64.4 EXTERNAL HEMORRHOID: Primary | ICD-10-CM

## 2023-10-10 DIAGNOSIS — I47.10 SVT (SUPRAVENTRICULAR TACHYCARDIA): ICD-10-CM

## 2023-10-10 DIAGNOSIS — F41.9 ANXIETY: ICD-10-CM

## 2023-10-10 PROCEDURE — 99214 OFFICE O/P EST MOD 30 MIN: CPT | Performed by: FAMILY MEDICINE

## 2023-10-10 SDOH — ECONOMIC STABILITY: HOUSING INSECURITY
IN THE LAST 12 MONTHS, WAS THERE A TIME WHEN YOU DID NOT HAVE A STEADY PLACE TO SLEEP OR SLEPT IN A SHELTER (INCLUDING NOW)?: NO

## 2023-10-10 SDOH — ECONOMIC STABILITY: FOOD INSECURITY: WITHIN THE PAST 12 MONTHS, THE FOOD YOU BOUGHT JUST DIDN'T LAST AND YOU DIDN'T HAVE MONEY TO GET MORE.: NEVER TRUE

## 2023-10-10 SDOH — ECONOMIC STABILITY: INCOME INSECURITY: HOW HARD IS IT FOR YOU TO PAY FOR THE VERY BASICS LIKE FOOD, HOUSING, MEDICAL CARE, AND HEATING?: NOT VERY HARD

## 2023-10-10 SDOH — ECONOMIC STABILITY: FOOD INSECURITY: WITHIN THE PAST 12 MONTHS, YOU WORRIED THAT YOUR FOOD WOULD RUN OUT BEFORE YOU GOT MONEY TO BUY MORE.: NEVER TRUE

## 2023-10-10 SDOH — ECONOMIC STABILITY: TRANSPORTATION INSECURITY
IN THE PAST 12 MONTHS, HAS LACK OF TRANSPORTATION KEPT YOU FROM MEETINGS, WORK, OR FROM GETTING THINGS NEEDED FOR DAILY LIVING?: NO

## 2023-10-10 ASSESSMENT — PATIENT HEALTH QUESTIONNAIRE - PHQ9
1. LITTLE INTEREST OR PLEASURE IN DOING THINGS: 0
SUM OF ALL RESPONSES TO PHQ9 QUESTIONS 1 & 2: 0
2. FEELING DOWN, DEPRESSED OR HOPELESS: NOT AT ALL
SUM OF ALL RESPONSES TO PHQ QUESTIONS 1-9: 0
SUM OF ALL RESPONSES TO PHQ9 QUESTIONS 1 & 2: 0
SUM OF ALL RESPONSES TO PHQ QUESTIONS 1-9: 0
SUM OF ALL RESPONSES TO PHQ QUESTIONS 1-9: 0
1. LITTLE INTEREST OR PLEASURE IN DOING THINGS: NOT AT ALL
SUM OF ALL RESPONSES TO PHQ QUESTIONS 1-9: 0
2. FEELING DOWN, DEPRESSED OR HOPELESS: 0

## 2023-10-10 NOTE — PROGRESS NOTES
Name: Luis Alfredo Negrete  : 1985         Chief Complaint:     Chief Complaint   Patient presents with    Palpitations     Doing well, has propranolol on hand if needed. Hemorrhoids     Rectal pain and bleeding. Feels like they are internal and external. The last hemorrhoid flare bled for 4 days. Will get internal and external pain with sitting, walking, standing. Worse with BM. Occasional soft stools but no diarrhea, rare constipation. Has used tucks, suppositories, witch hazel, creams, epsom salts. History of Present Illness:      Luis Alfredo Negrete is a 40 y.o.  female who presents with Palpitations (Doing well, has propranolol on hand if needed. ) and Hemorrhoids (Rectal pain and bleeding. Feels like they are internal and external. The last hemorrhoid flare bled for 4 days. Will get internal and external pain with sitting, walking, standing. Worse with BM. Occasional soft stools but no diarrhea, rare constipation. Has used tucks, suppositories, witch hazel, creams, epsom salts. )      HPI    Pt c/o rectal bleeding and pain. She has ext hemorrhoids which have flared intermittently for 12 yrs since pregnancy. Tx tried as above. Recently flared its worst. Very painful after BM and recently was also painful during. Pain shoots anteriorly towards labia (having problems there also, upcoming surgery) and can last for hours. Doesn't matter what the consistency of her stool is. Bled for 4 days but has stopped now. Blood was even going into clothing, had to wear panty liner. No abd pain. Does have h/o genital herpes, no outbreaks in a number of years. Anxiety well-controlled, in a healthy relationship now, doing fine at work, very rarely needing klonopin - thinks she's only used 1 dose in approx 18 mos. Rare palpitations, keeps propranolol on hand for prn use.      Medical History:     Patient Active Problem List   Diagnosis    H/O supraventricular tachycardia    Generalized anxiety disorder    Family

## 2023-10-10 NOTE — PATIENT INSTRUCTIONS
[employer] SURVEY:    You may be receiving a survey from [employer] regarding your visit today. You may get this in the mail, through your MyChart or in your email. Please complete the survey to enable us to provide the highest quality of care to you and your family. If you cannot score us as very good ( 5 Stars) on any question, please feel free to call the office to discuss how we could have made your experience exceptional.     Thank you.     Clinical Care Team:   Dr. Kay Myers, [address] [facility], [address]                                     Triage: Godfrey Vega, [address] [facility] Team:    [address] [facility]

## 2023-10-11 PROBLEM — I47.10 SVT (SUPRAVENTRICULAR TACHYCARDIA): Status: ACTIVE | Noted: 2023-10-11

## 2023-10-11 RX ORDER — HYDROCORTISONE ACETATE 25 MG/1
25 SUPPOSITORY RECTAL EVERY 12 HOURS
Qty: 14 SUPPOSITORY | Refills: 0 | Status: SHIPPED | OUTPATIENT
Start: 2023-10-11

## 2024-02-07 RX ORDER — PROPRANOLOL HYDROCHLORIDE 120 MG/1
CAPSULE, EXTENDED RELEASE ORAL
Qty: 30 CAPSULE | Refills: 1 | Status: SHIPPED | OUTPATIENT
Start: 2024-02-07

## 2024-02-07 NOTE — TELEPHONE ENCOUNTER
Last visit:  10/10/2023  Next Visit Date:  No future appointments.      Medication List:  Prior to Admission medications    Medication Sig Start Date End Date Taking? Authorizing Provider   hydrocortisone (ANUSOL-HC) 25 MG suppository Place 1 suppository rectally in the morning and 1 suppository in the evening. 10/11/23   Roxane Poon DO   propranolol (INDERAL LA) 120 MG extended release capsule TAKE 1 CAPSULE BY MOUTH DAILY 6/2/23   Roxane Poon DO   Levonorgestrel (KYLEENA) IUD 19.5 mg   Refills(s) 0 9/30/20   Provider, MD Niko

## 2024-02-07 NOTE — TELEPHONE ENCOUNTER
Patient is asking for a refill on propranolol 120 mg.  Patient last seen 10/10/23.  No future appt found.    [facility]     Health Maintenance   Topic Date Due    Hepatitis B vaccine (1 of 3 - 3-dose series) Never done    COVID-19 Vaccine (1) Never done    Varicella vaccine (1 of 2 - 2-dose childhood series) Never done    Hepatitis C screen  Never done    DTaP/Tdap/Td vaccine (1 - Tdap) Never done    Flu vaccine (1) Never done    Depression Screen  10/10/2024    Cervical cancer screen  09/12/2026    HIV screen  Addressed    Hepatitis A vaccine  Aged Out    Hib vaccine  Aged Out    HPV vaccine  Aged Out    Polio vaccine  Aged Out    Meningococcal (ACWY) vaccine  Aged Out    Pneumococcal 0-64 years Vaccine  Aged Out    Diabetes screen  Discontinued             (applicable per patient's age: Cancer Screenings, Depression Screening, Fall Risk Screening, Immunizations)    Hemoglobin A1C (%)   Date Value   11/25/2020 5.2     LDL Cholesterol (mg/dL)   Date Value   12/19/2012 59     AST (U/L)   Date Value   07/26/2022 18     ALT (U/L)   Date Value   07/26/2022 14     BUN (mg/dL)   Date Value   07/26/2022 9      (goal A1C is < 7)   (goal LDL is <100) need 30-50% reduction from baseline     BP Readings from Last 3 Encounters:   10/10/23 122/80   07/26/22 118/78   04/21/22 116/74    (goal /80)      All Future Testing planned in [facility]:  Lab Frequency Next Occurrence       Next Visit Date:  No future appointments.         Patient Active Problem List:     H/O supraventricular tachycardia     Generalized anxiety disorder     Family history of malignant neoplasm of ovary     SVT (supraventricular tachycardia)

## 2024-04-11 ENCOUNTER — TELEPHONE (OUTPATIENT)
Dept: FAMILY MEDICINE CLINIC | Age: 39
End: 2024-04-11

## 2024-04-11 NOTE — TELEPHONE ENCOUNTER
Patient had a procedure done yesterday - she is in a lot of pain - that provider is out of the office and that staff suggested that she call her PCP - unable to get work slip from them - she is frustrated because she has been in pain since October when she came in to see Dr Poon - she is scheduled to see that office next week

## 2024-04-11 NOTE — TELEPHONE ENCOUNTER
Patient has an appointment with Dr. Nolasco at 1pm today for this issue. They will give her something for pain and a work excuse if merited. Patient advised to keep the appt at the specialist at 1 today

## 2025-02-18 ENCOUNTER — HOSPITAL ENCOUNTER (OUTPATIENT)
Age: 40
Discharge: HOME | End: 2025-02-18
Payer: COMMERCIAL

## 2025-02-18 ENCOUNTER — OFFICE VISIT (OUTPATIENT)
Dept: FAMILY MEDICINE CLINIC | Age: 40
End: 2025-02-18

## 2025-02-18 VITALS
HEART RATE: 84 BPM | DIASTOLIC BLOOD PRESSURE: 80 MMHG | BODY MASS INDEX: 37.56 KG/M2 | SYSTOLIC BLOOD PRESSURE: 128 MMHG | WEIGHT: 220 LBS | HEIGHT: 64 IN | OXYGEN SATURATION: 99 %

## 2025-02-18 DIAGNOSIS — Z80.8 FAMILY HISTORY OF BRAIN CANCER: ICD-10-CM

## 2025-02-18 DIAGNOSIS — R53.82 CHRONIC FATIGUE: ICD-10-CM

## 2025-02-18 DIAGNOSIS — R10.10 UPPER ABDOMINAL PAIN: ICD-10-CM

## 2025-02-18 DIAGNOSIS — R52 PAIN OF MULTIPLE SITES: Primary | ICD-10-CM

## 2025-02-18 DIAGNOSIS — R52 PAIN OF MULTIPLE SITES: ICD-10-CM

## 2025-02-18 LAB
ALBUMIN SERPL-MCNC: 4.3 G/DL (ref 3.5–5.2)
ALBUMIN/GLOB SERPL: 1.4 {RATIO} (ref 1–2.5)
ALP SERPL-CCNC: 73 U/L (ref 35–104)
ALT SERPL-CCNC: 26 U/L (ref 5–33)
ANION GAP SERPL CALCULATED.3IONS-SCNC: 12 MMOL/L (ref 9–17)
AST SERPL-CCNC: 23 U/L
BASOPHILS # BLD: 0.03 K/UL (ref 0–0.2)
BASOPHILS NFR BLD: 0 % (ref 0–2)
BILIRUB SERPL-MCNC: 0.4 MG/DL (ref 0.3–1.2)
BUN SERPL-MCNC: 11 MG/DL (ref 6–20)
CALCIUM SERPL-MCNC: 8.8 MG/DL (ref 8.6–10.4)
CHLORIDE SERPL-SCNC: 103 MMOL/L (ref 98–107)
CO2 SERPL-SCNC: 24 MMOL/L (ref 20–31)
CREAT SERPL-MCNC: 0.9 MG/DL (ref 0.5–0.9)
EOSINOPHIL # BLD: 0.27 K/UL (ref 0–0.4)
EOSINOPHILS RELATIVE PERCENT: 4 % (ref 0–5)
ERYTHROCYTE [DISTWIDTH] IN BLOOD BY AUTOMATED COUNT: 11.7 % (ref 12.1–15.2)
ERYTHROCYTE [SEDIMENTATION RATE] IN BLOOD BY PHOTOMETRIC METHOD: 3 MM/HR (ref 0–20)
GFR, ESTIMATED: 84 ML/MIN/1.73M2
GLUCOSE SERPL-MCNC: 86 MG/DL (ref 70–99)
HCT VFR BLD AUTO: 40.3 % (ref 36–46)
HGB BLD-MCNC: 13.9 G/DL (ref 12–16)
IMM GRANULOCYTES # BLD AUTO: 0.01 K/UL (ref 0–0.3)
IMM GRANULOCYTES NFR BLD: 0 % (ref 0–5)
LIPASE SERPL-CCNC: 18 U/L (ref 13–60)
LYMPHOCYTES NFR BLD: 2.39 K/UL (ref 1–4.8)
LYMPHOCYTES RELATIVE PERCENT: 33 % (ref 15–40)
MAGNESIUM SERPL-MCNC: 2.3 MG/DL (ref 1.6–2.6)
MCH RBC QN AUTO: 30.3 PG (ref 26–34)
MCHC RBC AUTO-ENTMCNC: 34.5 G/DL (ref 31–37)
MCV RBC AUTO: 88 FL (ref 80–100)
MONOCYTES NFR BLD: 0.57 K/UL (ref 0–1)
MONOCYTES NFR BLD: 8 % (ref 4–8)
NEUTROPHILS NFR BLD: 55 % (ref 47–75)
NEUTS SEG NFR BLD: 4.03 K/UL (ref 2.5–7)
PLATELET # BLD AUTO: 314 K/UL (ref 140–450)
PMV BLD AUTO: 9.2 FL (ref 6–12)
POTASSIUM SERPL-SCNC: 4.1 MMOL/L (ref 3.7–5.3)
PROT SERPL-MCNC: 7.3 G/DL (ref 6.4–8.3)
RBC # BLD AUTO: 4.58 M/UL (ref 4–5.2)
SODIUM SERPL-SCNC: 139 MMOL/L (ref 135–144)
TSH SERPL DL<=0.05 MIU/L-ACNC: 4.17 UIU/ML (ref 0.27–4.2)
WBC OTHER # BLD: 7.3 K/UL (ref 3.5–11)

## 2025-02-18 PROCEDURE — 80053 COMPREHEN METABOLIC PANEL: CPT

## 2025-02-18 PROCEDURE — 83735 ASSAY OF MAGNESIUM: CPT

## 2025-02-18 PROCEDURE — 83690 ASSAY OF LIPASE: CPT

## 2025-02-18 PROCEDURE — 36415 COLL VENOUS BLD VENIPUNCTURE: CPT

## 2025-02-18 PROCEDURE — 82607 VITAMIN B-12: CPT

## 2025-02-18 PROCEDURE — 84439 ASSAY OF FREE THYROXINE: CPT

## 2025-02-18 PROCEDURE — 82746 ASSAY OF FOLIC ACID SERUM: CPT

## 2025-02-18 PROCEDURE — 84146 ASSAY OF PROLACTIN: CPT

## 2025-02-18 PROCEDURE — 85025 COMPLETE CBC W/AUTO DIFF WBC: CPT

## 2025-02-18 PROCEDURE — 86038 ANTINUCLEAR ANTIBODIES: CPT

## 2025-02-18 PROCEDURE — 86225 DNA ANTIBODY NATIVE: CPT

## 2025-02-18 PROCEDURE — 84443 ASSAY THYROID STIM HORMONE: CPT

## 2025-02-18 PROCEDURE — 85652 RBC SED RATE AUTOMATED: CPT

## 2025-02-18 SDOH — ECONOMIC STABILITY: INCOME INSECURITY: IN THE LAST 12 MONTHS, WAS THERE A TIME WHEN YOU WERE NOT ABLE TO PAY THE MORTGAGE OR RENT ON TIME?: NO

## 2025-02-18 SDOH — ECONOMIC STABILITY: FOOD INSECURITY: WITHIN THE PAST 12 MONTHS, THE FOOD YOU BOUGHT JUST DIDN'T LAST AND YOU DIDN'T HAVE MONEY TO GET MORE.: NEVER TRUE

## 2025-02-18 SDOH — ECONOMIC STABILITY: FOOD INSECURITY: WITHIN THE PAST 12 MONTHS, YOU WORRIED THAT YOUR FOOD WOULD RUN OUT BEFORE YOU GOT MONEY TO BUY MORE.: NEVER TRUE

## 2025-02-18 SDOH — ECONOMIC STABILITY: TRANSPORTATION INSECURITY
IN THE PAST 12 MONTHS, HAS THE LACK OF TRANSPORTATION KEPT YOU FROM MEDICAL APPOINTMENTS OR FROM GETTING MEDICATIONS?: NO

## 2025-02-18 ASSESSMENT — PATIENT HEALTH QUESTIONNAIRE - PHQ9
SUM OF ALL RESPONSES TO PHQ QUESTIONS 1-9: 2
1. LITTLE INTEREST OR PLEASURE IN DOING THINGS: SEVERAL DAYS
SUM OF ALL RESPONSES TO PHQ QUESTIONS 1-9: 2
SUM OF ALL RESPONSES TO PHQ QUESTIONS 1-9: 2
SUM OF ALL RESPONSES TO PHQ9 QUESTIONS 1 & 2: 2
2. FEELING DOWN, DEPRESSED OR HOPELESS: SEVERAL DAYS
SUM OF ALL RESPONSES TO PHQ QUESTIONS 1-9: 2
2. FEELING DOWN, DEPRESSED OR HOPELESS: SEVERAL DAYS
1. LITTLE INTEREST OR PLEASURE IN DOING THINGS: SEVERAL DAYS
SUM OF ALL RESPONSES TO PHQ9 QUESTIONS 1 & 2: 2

## 2025-02-19 LAB
ANA SER QL IA: NEGATIVE
DSDNA IGG SER QL IA: 1.1 IU/ML
FOLATE SERPL-MCNC: 9.1 NG/ML (ref 4.8–24.2)
NUCLEAR IGG SER IA-RTO: 0.3 U/ML
PROLACTIN SERPL-MCNC: 10.4 NG/ML (ref 4.79–23.3)
T4 FREE SERPL-MCNC: 1.3 NG/DL (ref 0.92–1.68)
VIT B12 SERPL-MCNC: 1046 PG/ML (ref 232–1245)

## 2025-02-19 NOTE — RESULT ENCOUNTER NOTE
No autoimmune condition detected. Labs all look great, no explanation for your symptoms. Keep a log and track your symptoms for the next 3 weeks and let us